# Patient Record
Sex: MALE | Race: BLACK OR AFRICAN AMERICAN | Employment: FULL TIME | URBAN - METROPOLITAN AREA
[De-identification: names, ages, dates, MRNs, and addresses within clinical notes are randomized per-mention and may not be internally consistent; named-entity substitution may affect disease eponyms.]

---

## 2017-03-16 ENCOUNTER — OFFICE VISIT (OUTPATIENT)
Dept: FAMILY MEDICINE CLINIC | Age: 37
End: 2017-03-16

## 2017-03-16 VITALS
BODY MASS INDEX: 25.67 KG/M2 | OXYGEN SATURATION: 95 % | RESPIRATION RATE: 20 BRPM | SYSTOLIC BLOOD PRESSURE: 132 MMHG | HEART RATE: 82 BPM | TEMPERATURE: 98 F | HEIGHT: 74 IN | DIASTOLIC BLOOD PRESSURE: 82 MMHG | WEIGHT: 200 LBS

## 2017-03-16 DIAGNOSIS — Z13.228 SCREENING FOR ENDOCRINE, METABOLIC AND IMMUNITY DISORDER: ICD-10-CM

## 2017-03-16 DIAGNOSIS — N48.89 PENILE PAIN: ICD-10-CM

## 2017-03-16 DIAGNOSIS — Z13.220 SCREENING FOR LIPID DISORDERS: ICD-10-CM

## 2017-03-16 DIAGNOSIS — Z13.0 SCREENING FOR ENDOCRINE, METABOLIC AND IMMUNITY DISORDER: ICD-10-CM

## 2017-03-16 DIAGNOSIS — Z72.0 TOBACCO USE: ICD-10-CM

## 2017-03-16 DIAGNOSIS — Z13.29 SCREENING FOR ENDOCRINE, METABOLIC AND IMMUNITY DISORDER: ICD-10-CM

## 2017-03-16 DIAGNOSIS — L30.9 ECZEMA, UNSPECIFIED TYPE: Primary | ICD-10-CM

## 2017-03-16 DIAGNOSIS — Z20.2 POSSIBLE EXPOSURE TO STD: ICD-10-CM

## 2017-03-16 RX ORDER — TRIAMCINOLONE ACETONIDE 5 MG/G
CREAM TOPICAL 2 TIMES DAILY
Qty: 15 G | Refills: 0 | Status: SHIPPED | OUTPATIENT
Start: 2017-03-16 | End: 2017-04-24 | Stop reason: SDUPTHER

## 2017-03-16 NOTE — PROGRESS NOTES
Patient: Celina Gómez MRN: [id number]  SSN: [id number]    YOB: 1980  Age: 43 y.o. Sex: male      Date of Service: 3/16/2017   Provider: Eorn Whitmore   Office Location:   [address] [facility], [facility], [address]  Office Phone: [phone]  Office Fax: [phone]        REASON FOR VISIT:   Chief Complaint   Patient presents with    Dry Skin     pt presents for Ecezma     Penis Pain      pt presents for penis pain and std testing         VITALS:   Visit Vitals    /82    Pulse 82    Temp 98 °F (36.7 °C) (Oral)    Resp 20    Ht 6' 2\" (1.88 m)    Wt 200 lb (90.7 kg)    SpO2 95%    BMI 25.68 kg/m2       MEDICATIONS:   No current medications     ALLERGIES:   No Known Allergies     ACTIVE MEDICAL PROBLEMS:  There is no problem list on file for this patient. MEDICAL/SURGICAL HISTORY:  History reviewed. No pertinent past medical history. History reviewed. No pertinent surgical history. FAMILY HISTORY:  History reviewed. No pertinent family history. SOCIAL HISTORY:  Social History   Substance Use Topics    Smoking status: Current Every Day Smoker    Smokeless tobacco: Not on file    Alcohol use Yes        HISTORY OF PRESENT ILLNESS:   Celina Gómez is a 43 y.o. male who presents to the office to establish care as a new patient    Chronic Medical Problems -   Patient reports no chronic medical problems. Has not seen a primary care provider in years. Acute Concerns -  Rash -   Patient complains of a pruritic rash on his lower abdomen, b/l elbows and knees x several months. Rash is described as \"itchy dry skin,\" which has become hard and thickened. He wonders if he may be developing eczema. Patient has tried  hydrocortisone cream, aveeno lotion, and another OTC eczema cream with minimal relief. Penile pain -   Patient complains of a \"painful vein\" on the dorsal surface of his penis x 3-4 days.  He states the vein has always been present, but appears swollen now. Describes an \"aching\" pain in the area of this vein, especially when he is walking or standing, or when he has an erection. He has tried Advil with minimal relief. He would like to be tested for STDs, as he reports he has been exposed to a female partner who tested positive for HSV 2 recently. REVIEW OF SYSTEMS:  Review of Systems   Constitutional: Negative for chills and fever. Respiratory: Negative for cough and shortness of breath. Cardiovascular: Negative for chest pain. Gastrointestinal: Negative for abdominal pain, nausea and vomiting. Genitourinary: Negative for dysuria, frequency, hematuria and urgency. Denies penile lesions or discharge   Skin: Positive for itching and rash. PHYSICAL EXAMINATION:  Physical Exam   Constitutional: He is well-developed, well-nourished, and in no distress. Cardiovascular: Normal rate, regular rhythm and normal heart sounds. Exam reveals no gallop and no friction rub. No murmur heard. Pulmonary/Chest: Effort normal and breath sounds normal. He has no wheezes. He has no rales. Genitourinary: Penis normal. No discharge found. Genitourinary Comments: prominent vein on dorsal surface of penis. No palpable clot. Nontender to palpation. No lesions noted   Skin: Skin is warm and dry. Rash ( patches of dry, thickened skin b/l elbows, lower abdomen. evidence of excoriation present) noted. RESULTS:  No results found for this visit on 03/16/17. ASSESSMENT/PLAN:  Evelio Rai was seen today for dry skin and penis pain.     Diagnoses and all orders for this visit:    Eczema, unspecified type  - Suspect atopic dermatitis   - Will trial higher potency steroid cream and refer to dermatology for further evaluation  - Regarding rash on lower abdomen, encouraged patient to change belts, as metal belt buckle may be exacerbating his skin condition   -     triamcinolone (ARISTOCORT) 0.5 % topical cream; Apply  to affected area two (2) times a day. use thin layer  -     REFERRAL TO DERMATOLOGY    Tobacco use  - Counseled on smoking cessation     Possible exposure to STD  -     HIV 1/2 AG/AB, 4TH GENERATION,W RFLX CONFIRM; Future  -     HSV 1/2 AB, IGG/IGM; Future  -     RPR; Future  -     Cancel: CHLAMYDIA/NEISSERIA/TRICHOMONAS AMP; Future  -     CHLAMYDIA/NEISSERIA/TRICHOMONAS AMP; Future    Penile pain  - Reassured of essentially normal exam, unsure of significance of prominent vein on penis  - Encouraged patient to wear supportive underwear and avoid sexual activity until STD screening results are back  - Will refer to urology for further evaluation. Advised he may cancel appointment if symptoms resolve. -     Cancel: CHLAMYDIA/NEISSERIA/TRICHOMONAS AMP; Future  -     REFERRAL TO UROLOGY  -     CHLAMYDIA/NEISSERIA/TRICHOMONAS AMP; Future    Screening for lipid disorders  -     LIPID PANEL; Future    Screening for endocrine, metabolic and immunity disorder  -     METABOLIC PANEL, COMPREHENSIVE    Check screening labs  Follow up with me in about a month for a routine physical and to review labs    Patient expresses understanding and is agreeable with the above plan.         PATIENT CARE TEAM:   Patient Care Team:  Eron Figueroa as PCP - [address] [place], [place]   March 20, 2017   8:35 AM

## 2017-03-16 NOTE — MR AVS SNAPSHOT
Visit Information Date & Time Provider Department Dept. Phone Encounter #  
 3/16/2017  5:30 PM Westley Dowd, [facility] [id number] Upcoming Health Maintenance Date Due DTaP/Tdap/Td series (1 - Tdap) 9/17/1999 INFLUENZA AGE 9 TO ADULT 8/1/2016 Allergies as of 3/16/2017  Review Complete On: 3/16/2017 By: LISBETH Galloway No Known Allergies Current Immunizations  Never Reviewed No immunizations on file. Not reviewed this visit You Were Diagnosed With   
  
 Codes Comments Eczema, unspecified type    -  Primary ICD-10-CM: L30.9 ICD-9-CM: 692.9 Tobacco use     ICD-10-CM: Z72.0 ICD-9-CM: 305.1 Possible exposure to STD     ICD-10-CM: Z20.2 ICD-9-CM: V01.6 Penile pain     ICD-10-CM: N48.89 ICD-9-CM: 607.9 Screening for lipid disorders     ICD-10-CM: Z13.220 ICD-9-CM: V77.91 Screening for endocrine, metabolic and immunity disorder     ICD-10-CM: Z13.29, Z13.228, Z13.0 ICD-9-CM: V77.99 Vitals BP Pulse Temp Resp Height(growth percentile) Weight(growth percentile) 132/82 82 98 °F (36.7 °C) (Oral) 20 6' 2\" (1.88 m) 200 lb (90.7 kg) SpO2 BMI Smoking Status 95% 25.68 kg/m2 Current Every Day Smoker Vitals History BMI and BSA Data Body Mass Index Body Surface Area  
 25.68 kg/m 2 2.18 m 2 Preferred Pharmacy Pharmacy Name Phone [facility] Beverley Sims [address] [place] [address] [phone] Your Updated Medication List  
  
   
This list is accurate as of: 3/16/17  6:21 PM.  Always use your most recent med list.  
  
  
  
  
 triamcinolone 0.5 % topical cream  
Commonly known as:  ARISTOCORT Apply  to affected area two (2) times a day. use thin layer Prescriptions Sent to Pharmacy  Refills  
 triamcinolone (ARISTOCORT) 0.5 % topical cream 0  
 Sig: Apply  to affected area two (2) times a day. use thin layer Class: Normal  
 Pharmacy: [facility] [address] [place] [address] Beverley Sims [address] [place] [address] #: [phone] Route: Topical  
  
We Performed the Following METABOLIC PANEL, COMPREHENSIVE [19985 CPT(R)] REFERRAL TO DERMATOLOGY [REF19 Custom] Comments:  
 Please evaluate patient for possible eczema. REFERRAL TO UROLOGY [AUL521 Custom] Comments:  
 Please evaluate patient for painful varicose vein on penis. To-Do List   
 03/16/2017 Lab:  CHLAMYDIA/NEISSERIA/TRICHOMONAS AMP   
  
 03/16/2017 Lab:  HIV 1/2 AG/AB, 4TH GENERATION,W RFLX CONFIRM   
  
 03/16/2017 Lab:  HSV 1/2 AB, IGG/IGM   
  
 03/16/2017 Lab:  RPR Around 03/17/2017 Lab:  LIPID PANEL Referral Information Referral ID Referred By Referred To  
  
 [id number] Shahid WINSTON Not Available Visits Status Start Date End Date 1 New Request 3/16/17 3/16/18 If your referral has a status of pending review or denied, additional information will be sent to support the outcome of this decision. Referral ID Referred By Referred To  
 [id number] Shahid WINSTON Not Available Visits Status Start Date End Date 1 New Request 3/16/17 3/16/18 If your referral has a status of pending review or denied, additional information will be sent to support the outcome of this decision. Introducing [facility]! Galina Candelaria introduces Heroes2u patient portal. Now you can access parts of your medical record, email your doctor's office, and request medication refills online. 1. In your internet browser, go to [email or web address] 2. Click on the First Time User? Click Here link in the Sign In box. You will see the New Member Sign Up page. 3. Enter your Heroes[employer]u Access Code exactly as it appears below.  You will not need to use this code after youve completed the sign-up process. If you do not sign up before the expiration date, you must request a new code. · Simple IT Access Code: [email or web address] Expires: 6/14/2017  5:20 PM 
 
4. Enter the last four digits of your Social Security Number (xxxx) and Date of Birth (mm/dd/yyyy) as indicated and click Submit. You will be taken to the next sign-up page. 5. Create a Simple IT ID. This will be your Simple IT login ID and cannot be changed, so think of one that is secure and easy to remember. 6. Create a Simple IT password. You can change your password at any time. 7. Enter your Password Reset Question and Answer. This can be used at a later time if you forget your password. 8. Enter your e-mail address. You will receive e-mail notification when new information is available in [address] 9. Click Sign Up. You can now view and download portions of your medical record. 10. Click the Download Summary menu link to download a portable copy of your medical information. If you have questions, please visit the Frequently Asked Questions section of the Simple IT website. Remember, Simple IT is NOT to be used for urgent needs. For medical emergencies, dial 911. Now available from your iPhone and Android! Please provide this summary of care documentation to your next provider. Your primary care clinician is listed as Sylvie Rice. If you have any questions after today's visit, please call [phone].

## 2017-03-17 DIAGNOSIS — Z13.220 SCREENING FOR LIPID DISORDERS: ICD-10-CM

## 2017-03-18 LAB
CHLAMYDIA AMPLIFIED URINE: NEGATIVE
GC AMPLIFIED URINE,919: NEGATIVE
TRICH NUCU, 17621: NEGATIVE

## 2017-03-21 ENCOUNTER — HOSPITAL ENCOUNTER (OUTPATIENT)
Dept: LAB | Age: 37
Discharge: HOME | End: 2017-03-21

## 2017-03-21 ENCOUNTER — TELEPHONE (OUTPATIENT)
Dept: FAMILY MEDICINE CLINIC | Age: 37
End: 2017-03-21

## 2017-03-21 LAB — SENTARA SPECIMEN COL,SENBCF: NORMAL

## 2017-03-21 PROCEDURE — 99001 SPECIMEN HANDLING PT-LAB: CPT | Performed by: PHYSICIAN ASSISTANT

## 2017-03-21 NOTE — TELEPHONE ENCOUNTER
Patient called stating he was returning JAMI PERKINS [facility] call.  He can be reached at [phone]

## 2017-03-22 LAB
CHOLEST SERPL-MCNC: 154 MG/DL (ref 110–200)
HDLC SERPL-MCNC: 35 MG/DL (ref 40–59)
HIV -1/0/2 AG/AB WITH REFLEX, 13463: NON REACTIVE
HIV 1 & 2 AB SER-IMP: NORMAL
HSV 2 AB,IGG, HS2G: >8 AI
HSV1 IGG SER QL: 0.2 AI
LDLC SERPL CALC-MCNC: 103 MG/DL (ref 50–99)
SYPHILIS (T. PALLIDUM) IGG, 15809: NON REACTIVE
TRIGL SERPL-MCNC: 80 MG/DL (ref 40–149)
VLDLC SERPL CALC-MCNC: 16 MG/DL (ref 8–30)

## 2017-03-23 LAB — HSV IGM I/II COMBINATION AB: 1.01 RATIO

## 2017-04-24 DIAGNOSIS — L30.9 ECZEMA, UNSPECIFIED TYPE: ICD-10-CM

## 2017-04-25 RX ORDER — TRIAMCINOLONE ACETONIDE 5 MG/G
CREAM TOPICAL
Qty: 15 G | Refills: 0 | Status: SHIPPED | OUTPATIENT
Start: 2017-04-25 | End: 2022-03-31 | Stop reason: ALTCHOICE

## 2019-01-14 ENCOUNTER — HOSPITAL ENCOUNTER (OUTPATIENT)
Dept: LAB | Age: 39
Discharge: HOME | End: 2019-01-14

## 2019-01-14 LAB — SENTARA SPECIMEN COL,SENBCF: NORMAL

## 2019-01-14 PROCEDURE — 99001 SPECIMEN HANDLING PT-LAB: CPT

## 2019-01-15 ENCOUNTER — HOSPITAL ENCOUNTER (OUTPATIENT)
Dept: LAB | Age: 39
Discharge: HOME | End: 2019-01-15

## 2019-01-15 LAB — SENTARA SPECIMEN COL,SENBCF: NORMAL

## 2019-01-15 PROCEDURE — 99001 SPECIMEN HANDLING PT-LAB: CPT

## 2022-03-18 PROBLEM — L30.9 ECZEMA: Status: ACTIVE | Noted: 2017-03-16

## 2022-03-19 PROBLEM — Z72.0 TOBACCO USE: Status: ACTIVE | Noted: 2017-03-16

## 2022-03-30 ENCOUNTER — HOSPITAL ENCOUNTER (OUTPATIENT)
Age: 42
Setting detail: OBSERVATION
Discharge: LEFT BEFORE DISCHARGE | End: 2022-03-31
Attending: EMERGENCY MEDICINE | Admitting: FAMILY MEDICINE
Payer: MEDICAID

## 2022-03-30 ENCOUNTER — APPOINTMENT (OUTPATIENT)
Dept: GENERAL RADIOLOGY | Age: 42
End: 2022-03-30
Attending: EMERGENCY MEDICINE
Payer: MEDICAID

## 2022-03-30 DIAGNOSIS — I26.94 MULTIPLE SUBSEGMENTAL PULMONARY EMBOLI WITHOUT ACUTE COR PULMONALE (HCC): Primary | ICD-10-CM

## 2022-03-30 DIAGNOSIS — F22 DELUSIONS (HCC): ICD-10-CM

## 2022-03-30 LAB
ALBUMIN SERPL-MCNC: 3.6 G/DL (ref 3.4–5)
ALBUMIN/GLOB SERPL: 0.9 {RATIO} (ref 0.8–1.7)
ALP SERPL-CCNC: 65 U/L (ref 45–117)
ALT SERPL-CCNC: 38 U/L (ref 16–61)
ANION GAP SERPL CALC-SCNC: 6 MMOL/L (ref 3–18)
AST SERPL-CCNC: 19 U/L (ref 10–38)
BILIRUB SERPL-MCNC: 1.1 MG/DL (ref 0.2–1)
BUN SERPL-MCNC: 12 MG/DL (ref 7–18)
BUN/CREAT SERPL: 10 (ref 12–20)
CALCIUM SERPL-MCNC: 9.3 MG/DL (ref 8.5–10.1)
CHLORIDE SERPL-SCNC: 103 MMOL/L (ref 100–111)
CO2 SERPL-SCNC: 27 MMOL/L (ref 21–32)
CREAT SERPL-MCNC: 1.19 MG/DL (ref 0.6–1.3)
GLOBULIN SER CALC-MCNC: 3.9 G/DL (ref 2–4)
GLUCOSE SERPL-MCNC: 116 MG/DL (ref 74–99)
LIPASE SERPL-CCNC: 53 U/L (ref 73–393)
POTASSIUM SERPL-SCNC: 3.9 MMOL/L (ref 3.5–5.5)
PROT SERPL-MCNC: 7.5 G/DL (ref 6.4–8.2)
SODIUM SERPL-SCNC: 136 MMOL/L (ref 136–145)
TROPONIN-HIGH SENSITIVITY: 4 NG/L (ref 0–78)

## 2022-03-30 PROCEDURE — 71045 X-RAY EXAM CHEST 1 VIEW: CPT

## 2022-03-30 PROCEDURE — 85379 FIBRIN DEGRADATION QUANT: CPT

## 2022-03-30 PROCEDURE — 85025 COMPLETE CBC W/AUTO DIFF WBC: CPT

## 2022-03-30 PROCEDURE — 99285 EMERGENCY DEPT VISIT HI MDM: CPT

## 2022-03-30 PROCEDURE — 84484 ASSAY OF TROPONIN QUANT: CPT

## 2022-03-30 PROCEDURE — 96374 THER/PROPH/DIAG INJ IV PUSH: CPT

## 2022-03-30 PROCEDURE — 82077 ASSAY SPEC XCP UR&BREATH IA: CPT

## 2022-03-30 PROCEDURE — 93005 ELECTROCARDIOGRAM TRACING: CPT

## 2022-03-30 PROCEDURE — 80053 COMPREHEN METABOLIC PANEL: CPT

## 2022-03-30 PROCEDURE — 74011250636 HC RX REV CODE- 250/636: Performed by: EMERGENCY MEDICINE

## 2022-03-30 PROCEDURE — 83690 ASSAY OF LIPASE: CPT

## 2022-03-30 RX ORDER — KETOROLAC TROMETHAMINE 15 MG/ML
15 INJECTION, SOLUTION INTRAMUSCULAR; INTRAVENOUS ONCE
Status: COMPLETED | OUTPATIENT
Start: 2022-03-30 | End: 2022-03-30

## 2022-03-30 RX ADMIN — KETOROLAC TROMETHAMINE 15 MG: 15 INJECTION, SOLUTION INTRAMUSCULAR; INTRAVENOUS at 23:32

## 2022-03-30 NOTE — Clinical Note
Status[de-identified] INPATIENT [101]  Type of Bed: Medical [8]  Inpatient Hospitalization Certified Necessary for the Following Reasons: 3.  Patient receiving treatment that can only be provided in an inpatient setting (further clarification in H&P documentation)   Admitting Diagnosis: Pulmonary embolism and infarction [facility]) [[id number]]  Admitting Physician: Michelle Ozuna [[id number]]  Attending Physician: Michelle Ozuna [[id number]]  Estimated Length of Stay: 2 Midnights  Discharge Plan[de-identified] Home with Office Follow-u p

## 2022-03-31 ENCOUNTER — APPOINTMENT (OUTPATIENT)
Dept: CT IMAGING | Age: 42
End: 2022-03-31
Attending: EMERGENCY MEDICINE
Payer: MEDICAID

## 2022-03-31 ENCOUNTER — APPOINTMENT (OUTPATIENT)
Dept: ULTRASOUND IMAGING | Age: 42
End: 2022-03-31
Attending: EMERGENCY MEDICINE
Payer: MEDICAID

## 2022-03-31 VITALS
WEIGHT: 210 LBS | TEMPERATURE: 99.1 F | SYSTOLIC BLOOD PRESSURE: 101 MMHG | OXYGEN SATURATION: 98 % | HEIGHT: 74 IN | DIASTOLIC BLOOD PRESSURE: 63 MMHG | RESPIRATION RATE: 17 BRPM | BODY MASS INDEX: 26.95 KG/M2 | HEART RATE: 69 BPM

## 2022-03-31 PROBLEM — I26.99 PULMONARY EMBOLISM AND INFARCTION (HCC): Status: ACTIVE | Noted: 2022-03-31

## 2022-03-31 PROBLEM — I26.99 PULMONARY EMBOLISM (HCC): Status: ACTIVE | Noted: 2022-03-31

## 2022-03-31 LAB
APTT PPP: 28.6 SEC (ref 23–36.4)
ATRIAL RATE: 86 BPM
B PERT DNA SPEC QL NAA+PROBE: NOT DETECTED
BASOPHILS # BLD: 0 K/UL (ref 0–0.1)
BASOPHILS # BLD: 0 K/UL (ref 0–0.1)
BASOPHILS NFR BLD: 0 % (ref 0–2)
BASOPHILS NFR BLD: 0 % (ref 0–2)
BORDETELLA PARAPERTUSSIS PCR, BORPAR: NOT DETECTED
C PNEUM DNA SPEC QL NAA+PROBE: NOT DETECTED
CALCULATED P AXIS, ECG09: 56 DEGREES
CALCULATED R AXIS, ECG10: 64 DEGREES
CALCULATED T AXIS, ECG11: 50 DEGREES
D DIMER PPP FEU-MCNC: 1.87 UG/ML(FEU)
DIAGNOSIS, 93000: NORMAL
DIFFERENTIAL METHOD BLD: ABNORMAL
DIFFERENTIAL METHOD BLD: ABNORMAL
EOSINOPHIL # BLD: 0 K/UL (ref 0–0.4)
EOSINOPHIL # BLD: 0 K/UL (ref 0–0.4)
EOSINOPHIL NFR BLD: 0 % (ref 0–5)
EOSINOPHIL NFR BLD: 0 % (ref 0–5)
ERYTHROCYTE [DISTWIDTH] IN BLOOD BY AUTOMATED COUNT: 12.2 % (ref 11.6–14.5)
ERYTHROCYTE [DISTWIDTH] IN BLOOD BY AUTOMATED COUNT: 12.2 % (ref 11.6–14.5)
ETHANOL SERPL-MCNC: <3 MG/DL (ref 0–3)
FLUAV SUBTYP SPEC NAA+PROBE: NOT DETECTED
FLUBV RNA SPEC QL NAA+PROBE: NOT DETECTED
HADV DNA SPEC QL NAA+PROBE: NOT DETECTED
HCOV 229E RNA SPEC QL NAA+PROBE: NOT DETECTED
HCOV HKU1 RNA SPEC QL NAA+PROBE: NOT DETECTED
HCOV NL63 RNA SPEC QL NAA+PROBE: NOT DETECTED
HCOV OC43 RNA SPEC QL NAA+PROBE: NOT DETECTED
HCT VFR BLD AUTO: 40.2 % (ref 36–48)
HCT VFR BLD AUTO: 43.6 % (ref 36–48)
HGB BLD-MCNC: 13.8 G/DL (ref 13–16)
HGB BLD-MCNC: 15 G/DL (ref 13–16)
HMPV RNA SPEC QL NAA+PROBE: NOT DETECTED
HPIV1 RNA SPEC QL NAA+PROBE: NOT DETECTED
HPIV2 RNA SPEC QL NAA+PROBE: NOT DETECTED
HPIV3 RNA SPEC QL NAA+PROBE: NOT DETECTED
HPIV4 RNA SPEC QL NAA+PROBE: NOT DETECTED
IMM GRANULOCYTES # BLD AUTO: 0 K/UL (ref 0–0.04)
IMM GRANULOCYTES # BLD AUTO: 0.1 K/UL (ref 0–0.04)
IMM GRANULOCYTES NFR BLD AUTO: 0 % (ref 0–0.5)
IMM GRANULOCYTES NFR BLD AUTO: 0 % (ref 0–0.5)
LYMPHOCYTES # BLD: 1.7 K/UL (ref 0.9–3.6)
LYMPHOCYTES # BLD: 1.7 K/UL (ref 0.9–3.6)
LYMPHOCYTES NFR BLD: 13 % (ref 21–52)
LYMPHOCYTES NFR BLD: 13 % (ref 21–52)
M PNEUMO DNA SPEC QL NAA+PROBE: NOT DETECTED
MCH RBC QN AUTO: 31.9 PG (ref 24–34)
MCH RBC QN AUTO: 31.9 PG (ref 24–34)
MCHC RBC AUTO-ENTMCNC: 34.3 G/DL (ref 31–37)
MCHC RBC AUTO-ENTMCNC: 34.4 G/DL (ref 31–37)
MCV RBC AUTO: 92.8 FL (ref 78–100)
MCV RBC AUTO: 93.1 FL (ref 78–100)
MONOCYTES # BLD: 1.3 K/UL (ref 0.05–1.2)
MONOCYTES # BLD: 1.7 K/UL (ref 0.05–1.2)
MONOCYTES NFR BLD: 10 % (ref 3–10)
MONOCYTES NFR BLD: 13 % (ref 3–10)
NEUTS SEG # BLD: 9.2 K/UL (ref 1.8–8)
NEUTS SEG # BLD: 9.9 K/UL (ref 1.8–8)
NEUTS SEG NFR BLD: 73 % (ref 40–73)
NEUTS SEG NFR BLD: 77 % (ref 40–73)
NRBC # BLD: 0 K/UL (ref 0–0.01)
NRBC # BLD: 0 K/UL (ref 0–0.01)
NRBC BLD-RTO: 0 PER 100 WBC
NRBC BLD-RTO: 0 PER 100 WBC
P-R INTERVAL, ECG05: 164 MS
PLATELET # BLD AUTO: 161 K/UL (ref 135–420)
PLATELET # BLD AUTO: 167 K/UL (ref 135–420)
PLATELET COMMENTS,PCOM: ABNORMAL
PMV BLD AUTO: 9.1 FL (ref 9.2–11.8)
PMV BLD AUTO: 9.1 FL (ref 9.2–11.8)
Q-T INTERVAL, ECG07: 336 MS
QRS DURATION, ECG06: 86 MS
QTC CALCULATION (BEZET), ECG08: 402 MS
RBC # BLD AUTO: 4.32 M/UL (ref 4.35–5.65)
RBC # BLD AUTO: 4.7 M/UL (ref 4.35–5.65)
RBC MORPH BLD: ABNORMAL
RSV RNA SPEC QL NAA+PROBE: NOT DETECTED
RV+EV RNA SPEC QL NAA+PROBE: NOT DETECTED
SARS-COV-2 PCR, COVPCR: NOT DETECTED
VENTRICULAR RATE, ECG03: 86 BPM
WBC # BLD AUTO: 12.7 K/UL (ref 4.6–13.2)
WBC # BLD AUTO: 12.9 K/UL (ref 4.6–13.2)

## 2022-03-31 PROCEDURE — 96366 THER/PROPH/DIAG IV INF ADDON: CPT

## 2022-03-31 PROCEDURE — 76705 ECHO EXAM OF ABDOMEN: CPT

## 2022-03-31 PROCEDURE — 65270000029 HC RM PRIVATE

## 2022-03-31 PROCEDURE — 85730 THROMBOPLASTIN TIME PARTIAL: CPT

## 2022-03-31 PROCEDURE — G0378 HOSPITAL OBSERVATION PER HR: HCPCS

## 2022-03-31 PROCEDURE — 74177 CT ABD & PELVIS W/CONTRAST: CPT

## 2022-03-31 PROCEDURE — 74011250636 HC RX REV CODE- 250/636: Performed by: EMERGENCY MEDICINE

## 2022-03-31 PROCEDURE — 74011000636 HC RX REV CODE- 636: Performed by: EMERGENCY MEDICINE

## 2022-03-31 PROCEDURE — 85025 COMPLETE CBC W/AUTO DIFF WBC: CPT

## 2022-03-31 PROCEDURE — 96367 TX/PROPH/DG ADDL SEQ IV INF: CPT

## 2022-03-31 PROCEDURE — 0202U NFCT DS 22 TRGT SARS-COV-2: CPT

## 2022-03-31 PROCEDURE — 71275 CT ANGIOGRAPHY CHEST: CPT

## 2022-03-31 RX ORDER — HEPARIN SODIUM 1000 [USP'U]/ML
80 INJECTION, SOLUTION INTRAVENOUS; SUBCUTANEOUS ONCE
Status: COMPLETED | OUTPATIENT
Start: 2022-03-31 | End: 2022-03-31

## 2022-03-31 RX ORDER — POLYETHYLENE GLYCOL 3350 17 G/17G
17 POWDER, FOR SOLUTION ORAL DAILY PRN
Status: DISCONTINUED | OUTPATIENT
Start: 2022-03-31 | End: 2022-03-31 | Stop reason: HOSPADM

## 2022-03-31 RX ORDER — SODIUM CHLORIDE 0.9 % (FLUSH) 0.9 %
5-40 SYRINGE (ML) INJECTION EVERY 8 HOURS
Status: DISCONTINUED | OUTPATIENT
Start: 2022-03-31 | End: 2022-03-31 | Stop reason: HOSPADM

## 2022-03-31 RX ORDER — SODIUM CHLORIDE 0.9 % (FLUSH) 0.9 %
5-40 SYRINGE (ML) INJECTION AS NEEDED
Status: DISCONTINUED | OUTPATIENT
Start: 2022-03-31 | End: 2022-03-31 | Stop reason: HOSPADM

## 2022-03-31 RX ADMIN — IOPAMIDOL 100 ML: 755 INJECTION, SOLUTION INTRAVENOUS at 01:28

## 2022-03-31 RX ADMIN — HEPARIN SODIUM 7620 UNITS: 1000 INJECTION, SOLUTION INTRAVENOUS; SUBCUTANEOUS at 02:34

## 2022-03-31 RX ADMIN — HEPARIN SODIUM 18 UNITS/KG/HR: 1000 INJECTION, SOLUTION INTRAVENOUS; SUBCUTANEOUS at 02:34

## 2022-03-31 NOTE — ED TRIAGE NOTES
Pt arrives via EMS with c/o RUQ pain that radiates to the shoulder; onset of pain was this morning. Stated he has not had a bowel movement today & took some gas relief medication, believing that was the cause of the pain. Stated pain is \"excruciating\" & nothing makes it worse or better. No hx of abdominal surgery.

## 2022-03-31 NOTE — DISCHARGE SUMMARY
[address] Medicine  Discharge Summary AMA    Patient: Robert Woodall MRN: [id number]  CSN: [id number]    YOB: 1980  Age: 35 y.o. Sex: male      Admission Date: 3/30/2022 Discharge Date: 3/31/2022   Attending: Dr. Para Lanes PCP: Isael Warren MD     ===================================================================    Patient left AMA after admission orders were placed but before HPI submitted. Nurse documented encounter where pt demonstrated intent to harm others upon leaving the hospital. ED attending Dr. Roula Medrano documented crisis consult at that time. This writer did have conversation with pt where he was A&Ox4 and verbalized understanding that risk of leaving without proper treatment could be death and benefits would mitigate that though continued to state nonspecific claims that he would \"harm the people responsible for doing this to him\". Shortly after this encounter pt left the floor abruptly. ED charge nurse notified local authorities who responded and were alleged to have found the pt though pt was not returned to the hospital.      Reason for Admission: Pulmonary embolism ([facility] [address] [I26.99]  Pulmonary embolism and infarction [facility]) [I26.99]    Discharge Diagnoses:   Pulmonary Embolism  Homicidal Ideation    Important notes to PCP/ follow-up studies and evaluations   Confirm patient is still a [facility] Medicine Patient    Pending labs and studies:  none    Operative Procedures:   none    Discharge Medications:     Discharge Medication List as of 3/31/2022  5:09 AM   NONE     Disposition: Unknown - home/car?     Consultants:  Crisis management (patient was not seen due to [facility])    [address] [facility] Hospital Course (including pertinent history and physical findings)  Reason for admission: Pulmonary Embolism    Bilateral Pulmonary Embolism with Likely RLL Pulmonary Infarct vs superimposed infection              Arrived with SOB, RUQ pain with radiation to right shoulder, EKG NSR, troponins 4 WNL, less likely cardiac. Lipase 53 WNL unlikely pancreatitis, also no N/V or diarrhea, RUQ US normal and with neg murphys which makes cholecystitis unlikely, CT abd pelvis non-acute, Dimer elevated to 1.87, CTA chest: B/L acute PE's with moderate clot burden in RLL along with patchy consolidation concerning for infarcts vs superimposed infectious infiltrate. CXR pending. Presentation and all features currently most likely 2/2 to acute B/L PE's with RLL infarct. Though he does report a purely subjective fever doubt infectious component as pt denies sick contacts, coughing, runny nose. Unclear whether this was provoked or unprovoked as physical exam did not reveal popliteal TTP or extremity swelling and pt denies hx or fmhx of hypercoagulability but suspect DVT as source given pt extended hx of prolonged immobility within his car. Plan:  - admit to medicine w/ tele  - continue heparin gtt for now, can transition to PO NOAC later today  - f/u BLE PVLs  - Consider pulm consult for RLL infarc    Homicidal Ideation  Patient endorsed homicidal ideations and left AMA. [place] [employer] informed of patient's passive non-specific homicidal ideations. Primary team followed up with [facility] non-emergency line and ED staff who stated that Police has found patient but he was not brought back to hospital.      CURRENT ADMISSION IMAGING RESULTS   CTA CHEST W OR W WO CONT    Result Date: 3/31/2022  Bilateral right greater than left lower lobe acute appearing pulmonary emboli, moderate burden in the right lower lobe. No specific evidence of right heart strain. Right lower lobe patchy consolidation, likely at least partly due to infarcts in the setting, but superimposed infectious infiltrate not excluded. -Left extensive hazy streaky densities at the left lower lobe. Trace right pleural effusion. Significant findings called to Dr. Alexx Berry at 0145 hours, 3/31/2022.  Heterogeneous thyroid with the better characterized by ultrasound. CT ABD PELV W CONT    Result Date: 3/31/2022  No acute findings in abdomen or pelvis. See same day CTA chest report. US ABD LTD    Result Date: 3/31/2022  No clearly acute findings. Slightly increased right renal echotexture could reflect medical renal disease. Borderline prominent liver size.         Cardiology Procedures/Testing:  MODALITY RESULTS   EKG Results for orders placed or performed during the hospital encounter of 03/30/22   EKG, 12 LEAD, INITIAL   Result Value Ref Range    Ventricular Rate 86 BPM    Atrial Rate 86 BPM    P-R Interval 164 ms    QRS Duration 86 ms    Q-T Interval 336 ms    QTC Calculation (Bezet) 402 ms    Calculated P Axis 56 degrees    Calculated R Axis 64 degrees    Calculated T Axis 50 degrees    Diagnosis       Normal sinus rhythm  Normal ECG  No previous ECGs available           Special Testing/Procedures:  MODALITY RESULTS   MICRO All Micro Results     Procedure Component Value Units Date/Time    RESPIRATORY VIRUS PANEL W/COVID-19, PCR [[id number]] Collected: 03/31/22 0407    Order Status: Completed Specimen: Nasopharyngeal Updated: 03/31/22 0506     Adenovirus Not detected        Coronavirus 229E Not detected        Coronavirus HKU1 Not detected        Coronavirus CVNL63 Not detected        Coronavirus OC43 Not detected        SARS-CoV-2, PCR Not detected        Metapneumovirus Not detected        Rhinovirus and Enterovirus Not detected        Influenza A Not detected        Influenza B Not detected        Parainfluenza 1 Not detected        Parainfluenza 2 Not detected        Parainfluenza 3 Not detected        Parainfluenza virus 4 Not detected        RSV by PCR Not detected        B. parapertussis, PCR Not detected        Bordetella pertussis - PCR Not detected        Chlamydophila pneumoniae DNA, QL, PCR Not detected        Mycoplasma pneumoniae DNA, QL, PCR Not detected              Laboratory Results:  LABORATORY RESULTS   HEMATOLOGY Lab Results   Component Value Date/Time    WBC 12.7 03/31/2022 02:28 AM    HGB 13.8 03/31/2022 02:28 AM    HCT 40.2 03/31/2022 02:28 AM    PLATELET 416 31/26/0832 02:28 AM    MCV 93.1 03/31/2022 02:28 AM       CHEMISTRIES Lab Results   Component Value Date/Time    Sodium 136 03/30/2022 11:25 PM    Potassium 3.9 03/30/2022 11:25 PM    Chloride 103 03/30/2022 11:25 PM    CO2 27 03/30/2022 11:25 PM    Anion gap 6 03/30/2022 11:25 PM    Glucose 116 (H) 03/30/2022 11:25 PM    BUN 12 03/30/2022 11:25 PM    Creatinine 1.19 03/30/2022 11:25 PM    BUN/Creatinine ratio 10 (L) 03/30/2022 11:25 PM    GFR est AA >60 03/30/2022 11:25 PM    GFR est non-AA >60 03/30/2022 11:25 PM    Calcium 9.3 03/30/2022 11:25 PM      HEPATIC FUNCTION Lab Results   Component Value Date/Time    Albumin 3.6 03/30/2022 11:25 PM    Bilirubin, total 1.1 (H) 03/30/2022 11:25 PM    Protein, total 7.5 03/30/2022 11:25 PM    Globulin 3.9 03/30/2022 11:25 PM    A-G Ratio 0.9 03/30/2022 11:25 PM    ALT (SGPT) 38 03/30/2022 11:25 PM    Alk.  phosphatase 65 03/30/2022 11:25 PM       LACTIC ACID No results found for: LAC   CARDIAC PANEL No results found for: CPK, RCK1, RCK2, RCK3, RCK4, CKMB, CKNDX, CKND1, TROPT, TROIQ, BNPP, BNP   NT-proBNP No results found for: BNP, BNPP, BNPPPOC, XBNPT, BNPNT   THYROID No results found for: TSH, TSHEXT, TSH2, TSH3, TSHP, TSHELE, TT3, T3U, T3UP, FRT3, FT3, T3T, FT4, FT4P, T4, T4P, FT4T, TT7, TSHEXT   LIPID PANEL Lab Results   Component Value Date/Time    Cholesterol, total 154 03/21/2017 11:31 AM    HDL Cholesterol 35 (L) 03/21/2017 11:31 AM    LDL, calculated 103 (H) 03/21/2017 11:31 AM    VLDL, calculated 16 03/21/2017 11:31 AM    Triglyceride 80 03/21/2017 11:31 AM         RISK CALCULATORS:  SCORE RESULT   ASCVD The ASCVD Risk score (Stephanie Grimaldo, et al., 2013) failed to calculate for the following reasons:    Cannot find a previous HDL lab    Cannot find a previous total cholesterol lab    [id number]     HAS-BLED READMISSION RISK SCORE Low Risk            0 Total Score        Criteria that do not apply:    Has Seen PCP in Last 6 Months (Yes=3, No=0)    . Living with Significant Other. Assisted Living. LTAC. SNF. or   Rehab    Patient Length of Stay (>5 days = 3)    IP Visits Last 12 Months (1-3=4, 4=9, >4=11)    Pt.  Coverage (Medicare=5 , Medicaid, or Self-Pay=4)    Charlson Comorbidity Score (Age + Comorbid Conditions)           Functional status and cognitive function:    Ambulates with: independently   Status: alert, cooperative, no distress, appears stated age  Condition: STABLE  Disposition: home/car, patient left AMA    Diet: General Diet    Code status and advanced care plan: Full    Point of Contact Wiliam Gomez  Relationship: Mother  [phone]     Patient Education:  None - Patient left AMA    Follow-up:   Follow-up Information     Follow up With Specialties Details Why Contact Info    Pam Cooper MD Family Medicine In 3 days  [address]  [phone]            =================================================================    Naun Malagon MD  Resident Physician   [facility] Medicine   March 31, 2022

## 2022-03-31 NOTE — H&P
Admission History and Physical  [facility]          Patient: Radha Betancourt MRN: [id number]  CSN: [id number]    YOB: 1980  Age: 35 y.o. Sex: male      Admission Date: 3/30/2022       HPI:     Patient left AMA after admission orders were placed but before HPI submitted. Nurse documented encounter where pt demonstrated intent to harm others upon leaving the hospital. ED attending Dr. Amaris Rodríguez documented crisis consult at that time. This writer did have conversation with pt where he was A&Ox4 and verbalized understanding that risk of leaving without proper treatment could be death and benefits would mitigate that though continued to state nonspecific claims that he would \"harm the people responsible for doing this to him\". Shortly after this encounter pt left the floor abruptly. ED charge nurse notified local authorities who responded and were alleged to have found the pt though pt was not returned to the hospital.       Radha Betancourt is a 35 y.o. male with no reported pmhx, now presenting with complaint of SOB, hemoptysis, and RUQ pain. Pt reports that yesterday he suddenly became more SOB and had four episodes of coughing with blood tinged mucus. He awoke this morning with worsening SOB and new RUQ pain which radiated to the right shoulder, changes with movement, and hurts most on deep inspiration. Pt states this has never happened before. He has been living in his car for the last 3 years and keeps his car parked in front of his mothers house. He is largely immobile for many hours every day and has been for 2 years though he does get up and move around occasionally. He states he had a fever yesterday but didn't take his temperature. He hasnt felt feverish since and has been around no sick contacts. He denies HA, vision changes, abd pain, urinary/stool changes. Pt is on no medications.  States he last saw Dr. Angelica Melchor a couple of years ago and [facility]. He states he last went 2 weeks ago but denies having any medical conditions. Pt has smoked 1.5 packs of cigarettes per day for 20 yrs giving him a 30 pack year hx. Doesn't drink ETOH. Smokes marijuana but does no other drugs. Specifically denies hx of IVDU. No recent intercourse or partners. He states he is not depressed. Denies SI/HI     ED Course (See objective for values/interpretations): Arrived with SOB, RUQ pain with radiation to right shoulder, EKG NSR, troponins 4 WNL, Lipase 53 WNL, RUQ US normal and with neg murphys, CT abd pelvis non-acute, Dimer elevated to 1.87, CTA chest: B/L acute PE's with moderate clot burden in RLL along with patchy consolidation concerning for infarcts vs superimposed infectious infiltrate. CXR pending. Labs obtained: Lipase, CBC, CMP, trops, ddimer  Medications administered: heparin gtt  Imaging obtained: CT abd pelvis, RUQ, US, CTA chest, CXR    ROS    History reviewed. No pertinent past medical history. History reviewed. No pertinent surgical history. History reviewed. No pertinent family history. Social History     Socioeconomic History    Marital status: SINGLE   Tobacco Use    Smoking status: Current Every Day Smoker   Substance and Sexual Activity    Alcohol use: Yes    Drug use: No    Sexual activity: Yes     Partners: Female     Birth control/protection: None       No Known Allergies    None       Objective:     Patient Vitals for the past 24 hrs:   Temp Pulse Resp BP SpO2   03/31/22 0245 -- 71 23 (!) 104/55 96 %   03/30/22 2355 -- -- -- 111/65 93 %   03/30/22 2330 -- -- -- 124/70 94 %   03/30/22 2329 99.6 °F (37.6 °C) 84 18 -- --   03/30/22 2329 -- -- -- -- 94 %   03/30/22 2314 -- -- -- 127/80 --       Physical Exam:   General:  AAOx3, NAD   HEENT: Conjunctiva pink, sclera anicteric. PERRL. EOMI. Pharynx moist, nonerythematous. Moist mucous membranes. Thyroid not enlarged, no nodules. No lymphadenopathy.   No other gross abnormalities present. CV:  RRR, no M/G/R. No visible pulsations or thrills. RESP:  Unlabored breathing. Lungs CTAB, no wheezes, rales or rhonchi appreciated. Equal expansion bilaterally. ABD:  Soft, nontender, nondistended. BS (+). No hepatosplenomegaly. No suprapubic tenderness. Negative murphys  MS:  No joint deformity or instability. No atrophy. Neuro:  CN II-XII grossly intact. 5/5 strength bilateral upper extremities and lower extremities. Ext:  No edema. 2+ radial and dp pulses bilaterally. No TTP of the popliteal spaces  Skin:  No rashes, lesions, or ulcers. Good turgor. Psych: blunted mood with flattened affect. No SI/HI    Labs & Imaging:   Recent Results (from the past 48 hour(s))   CBC WITH AUTOMATED DIFF    Collection Time: 03/30/22 11:25 PM   Result Value Ref Range    WBC 12.9 4.6 - 13.2 K/uL    RBC 4.70 4.35 - 5.65 M/uL    HGB 15.0 13.0 - 16.0 g/dL    HCT 43.6 36.0 - 48.0 %    MCV 92.8 78.0 - 100.0 FL    MCH 31.9 24.0 - 34.0 PG    MCHC 34.4 31.0 - 37.0 g/dL    RDW 12.2 11.6 - 14.5 %    PLATELET 310 206 - 130 K/uL    MPV 9.1 (L) 9.2 - 11.8 FL    NRBC 0.0 0  WBC    ABSOLUTE NRBC 0.00 0.00 - 0.01 K/uL    NEUTROPHILS 77 (H) 40 - 73 %    LYMPHOCYTES 13 (L) 21 - 52 %    MONOCYTES 10 3 - 10 %    EOSINOPHILS 0 0 - 5 %    BASOPHILS 0 0 - 2 %    IMMATURE GRANULOCYTES 0 0.0 - 0.5 %    ABS. NEUTROPHILS 9.9 (H) 1.8 - 8.0 K/UL    ABS. LYMPHOCYTES 1.7 0.9 - 3.6 K/UL    ABS. MONOCYTES 1.3 (H) 0.05 - 1.2 K/UL    ABS. EOSINOPHILS 0.0 0.0 - 0.4 K/UL    ABS. BASOPHILS 0.0 0.0 - 0.1 K/UL    ABS. IMM.  GRANS. 0.0 0.00 - 0.04 K/UL    DF MANUAL      PLATELET COMMENTS ADEQUATE PLATELETS      RBC COMMENTS NORMOCYTIC, NORMOCHROMIC     METABOLIC PANEL, COMPREHENSIVE    Collection Time: 03/30/22 11:25 PM   Result Value Ref Range    Sodium 136 136 - 145 mmol/L    Potassium 3.9 3.5 - 5.5 mmol/L    Chloride 103 100 - 111 mmol/L    CO2 27 21 - 32 mmol/L    Anion gap 6 3.0 - 18 mmol/L    Glucose 116 (H) 74 - 99 mg/dL    BUN 12 7.0 - 18 MG/DL    Creatinine 1.19 0.6 - 1.3 MG/DL    BUN/Creatinine ratio 10 (L) 12 - 20      GFR est AA >60 >60 ml/min/1.73m2    GFR est non-AA >60 >60 ml/min/1.73m2    Calcium 9.3 8.5 - 10.1 MG/DL    Bilirubin, total 1.1 (H) 0.2 - 1.0 MG/DL    ALT (SGPT) 38 16 - 61 U/L    AST (SGOT) 19 10 - 38 U/L    Alk. phosphatase 65 45 - 117 U/L    Protein, total 7.5 6.4 - 8.2 g/dL    Albumin 3.6 3.4 - 5.0 g/dL    Globulin 3.9 2.0 - 4.0 g/dL    A-G Ratio 0.9 0.8 - 1.7     LIPASE    Collection Time: 03/30/22 11:25 PM   Result Value Ref Range    Lipase 53 (L) 73 - 393 U/L   D DIMER    Collection Time: 03/30/22 11:25 PM   Result Value Ref Range    D DIMER 1.87 (H) <0.46 ug/ml(FEU)   TROPONIN-HIGH SENSITIVITY    Collection Time: 03/30/22 11:25 PM   Result Value Ref Range    Troponin-High Sensitivity 4 0 - 78 ng/L   EKG, 12 LEAD, INITIAL    Collection Time: 03/30/22 11:37 PM   Result Value Ref Range    Ventricular Rate 86 BPM    Atrial Rate 86 BPM    P-R Interval 164 ms    QRS Duration 86 ms    Q-T Interval 336 ms    QTC Calculation (Bezet) 402 ms    Calculated P Axis 56 degrees    Calculated R Axis 64 degrees    Calculated T Axis 50 degrees    Diagnosis       Normal sinus rhythm  Normal ECG  No previous ECGs available     CBC WITH AUTOMATED DIFF    Collection Time: 03/31/22  2:28 AM   Result Value Ref Range    WBC 12.7 4.6 - 13.2 K/uL    RBC 4.32 (L) 4.35 - 5.65 M/uL    HGB 13.8 13.0 - 16.0 g/dL    HCT 40.2 36.0 - 48.0 %    MCV 93.1 78.0 - 100.0 FL    MCH 31.9 24.0 - 34.0 PG    MCHC 34.3 31.0 - 37.0 g/dL    RDW 12.2 11.6 - 14.5 %    PLATELET 857 086 - 555 K/uL    MPV 9.1 (L) 9.2 - 11.8 FL    NRBC 0.0 0  WBC    ABSOLUTE NRBC 0.00 0.00 - 0.01 K/uL    NEUTROPHILS 73 40 - 73 %    LYMPHOCYTES 13 (L) 21 - 52 %    MONOCYTES 13 (H) 3 - 10 %    EOSINOPHILS 0 0 - 5 %    BASOPHILS 0 0 - 2 %    IMMATURE GRANULOCYTES 0 0.0 - 0.5 %    ABS. NEUTROPHILS 9.2 (H) 1.8 - 8.0 K/UL    ABS.  LYMPHOCYTES 1.7 0.9 - 3.6 K/UL ABS. MONOCYTES 1.7 (H) 0.05 - 1.2 K/UL    ABS. EOSINOPHILS 0.0 0.0 - 0.4 K/UL    ABS. BASOPHILS 0.0 0.0 - 0.1 K/UL    ABS. IMM. GRANS. 0.1 (H) 0.00 - 0.04 K/UL    DF AUTOMATED     PTT    Collection Time: 03/31/22  2:28 AM   Result Value Ref Range    aPTT 28.6 23.0 - 36.4 SEC       Assessment & Plan:   35 y.o. male now admitted with B/L PE's with likely PLL pulmonary infarct. 1. Bilateral Pulmonary Embolism with Likely RLL Pulmonary Infarct vs superimposed infection   Arrived with SOB, RUQ pain with radiation to right shoulder, EKG NSR, troponins 4 WNL, less likely cardiac. Lipase 53 WNL unlikely pancreatitis, also no N/V or diarrhea, RUQ US normal and with neg murphys which makes cholecystitis unlikely, CT abd pelvis non-acute, Dimer elevated to 1.87, CTA chest: B/L acute PE's with moderate clot burden in RLL along with patchy consolidation concerning for infarcts vs superimposed infectious infiltrate. CXR pending. Presentation and all features currently most likely 2/2 to acute B/L PE's with RLL infarct. Though he does report a purely subjective fever doubt infectious component as pt denies sick contacts, coughing, runny nose. Unclear whether this was provoked or unprovoked as physical exam did not reveal popliteal TTP or extremity swelling and pt denies hx or fmhx of hypercoagulability but suspect DVT as source given pt extended hx of prolonged immobility within his car. Plan:  - admit to medicine w/ tele  - daily CBC, CMP  - continue heparin gtt for now, can transition to PO NOAC later today  - f/u BLE PVLs  - Consider pulm consult for RLL infarct  - If PVLs are negative plan for hypercoagulability w/u  - PT/OT/CM  - Regular diet    2.  Homicidal Ideation  - patient endorsed homicidal ideations when transport team came to transport patient to floor  Plan:  - crisis management consult in the AM  - continue boarding in ED until crisis management evaluates patient in AM    [employer]:  - VS per unit routine  - supplemental O2 for sats < 92%  - incentive spirometer  - PT/OT/CM    Diet  Regular   DVT Prophylaxis  Heparin gtt   GI Prophylaxis     Code status  Full   Disposition  TBD     Point of Contact Rosario Singh  Relationship: Mother  [phone]      Sangeetha Yan MD , PGY-1   [facility]   March 31, 2022, 3:00 AM        I have also seen and independently evaluated the patient. I agree with the plan as noted above.   Maykel Centeno MD  Resident Physician   [facility]   March 31, 2022

## 2022-03-31 NOTE — ED PROVIDER NOTES
EMERGENCY DEPARTMENT HISTORY AND PHYSICAL EXAM    10:56 PM patient seen at this time and EMS stretcher in [facility] to expedite care      Date: 3/30/2022  Patient Name: Mitzy Paz    History of Presenting Illness     Chief Complaint   Patient presents with    Flank Pain    Shoulder Pain         History Provided By: patient    Additional History (Context): Mitzy Paz is a 35 y.o. male presents with woke up this morning with severe right upper quadrant and right-sided chest pain that radiates to his right scapula area worse with breathing any position change. He is splinting his breaths. .  He did belch took some gas pills. No bowel movement no passing of flatus. Pain is severe. Constant, does not ease. Jered Mack PCP: Rylie Newman MD    Chief Complaint:   Duration:    Timing:    Location:   Quality:   Severity:   Modifying Factors:   Associated Symptoms:       Current Facility-Administered Medications   Medication Dose Route Frequency Provider Last Rate Last Admin    heparin 25,000 units in  ml infusion  18-36 Units/kg/hr IntraVENous TITRATE Fred Shah MD        heparin (porcine) 1,000 unit/mL injection 7,620 Units  80 Units/kg IntraVENous ONCE Rob Mccarty MD         Current Outpatient Medications   Medication Sig Dispense Refill    triamcinolone (ARISTOCORT) 0.5 % topical cream APPLY TO AFFECTED AREA TWICE DAILY. USE A THIN LAYER. 15 g 0       Past History     Past Medical History:  History reviewed. No pertinent past medical history. Past Surgical History:  History reviewed. No pertinent surgical history. Family History:  History reviewed. No pertinent family history. Social History:  Social History     Tobacco Use    Smoking status: Current Every Day Smoker    Smokeless tobacco: Not on file   Substance Use Topics    Alcohol use:  Yes    Drug use: No       Allergies:  No Known Allergies      Review of Systems     Review of Systems   Constitutional: Negative for diaphoresis and fever. HENT: Negative for congestion and sore throat. Eyes: Negative for pain and itching. Respiratory: Negative for cough and shortness of breath. Cardiovascular: Positive for chest pain. Negative for palpitations. Gastrointestinal: Positive for abdominal pain. Negative for diarrhea. Endocrine: Negative for polydipsia and polyuria. Genitourinary: Negative for dysuria and hematuria. Musculoskeletal: Negative for arthralgias and myalgias. Skin: Negative for rash and wound. Neurological: Negative for seizures and syncope. Hematological: Does not bruise/bleed easily. Psychiatric/Behavioral: Negative for agitation and hallucinations. Physical Exam       Patient Vitals for the past 12 hrs:   Temp Pulse Resp BP SpO2   03/30/22 2355 -- -- -- 111/65 93 %   03/30/22 2330 -- -- -- 124/70 94 %   03/30/22 2329 99.6 °F (37.6 °C) 84 18 -- --   03/30/22 2329 -- -- -- -- 94 %   03/30/22 2314 -- -- -- 127/80 --       IPVITALS  Patient Vitals for the past 24 hrs:   BP Temp Pulse Resp SpO2 Height Weight   03/30/22 2355 111/65 -- -- -- 93 % -- --   03/30/22 2330 124/70 -- -- -- 94 % -- --   03/30/22 2329 -- 99.6 °F (37.6 °C) 84 18 -- 6' 2\" (1.88 m) 95.3 kg (210 lb)   03/30/22 2329 -- -- -- -- 94 % -- --   03/30/22 2314 127/80 -- -- -- -- -- --       Physical Exam  Vitals and nursing note reviewed. Constitutional:       General: He is in acute distress. Appearance: Normal appearance. He is well-developed. HENT:      Head: Normocephalic and atraumatic. Eyes:      General: No scleral icterus. Conjunctiva/sclera: Conjunctivae normal.   Neck:      Vascular: No JVD. Cardiovascular:      Rate and Rhythm: Normal rate and regular rhythm. Heart sounds: Normal heart sounds. Comments: 4 intact extremity pulses  Pulmonary:      Effort: Pulmonary effort is normal.      Breath sounds: Normal breath sounds. Abdominal:      Palpations: Abdomen is soft. There is no mass. Tenderness: There is abdominal tenderness (Right upper quadrant and right lower rib cage tenderness). Comments: Voluntary guarding   Musculoskeletal:         General: Normal range of motion. Cervical back: Normal range of motion and neck supple. Lymphadenopathy:      Cervical: No cervical adenopathy. Skin:     General: Skin is warm and dry. Neurological:      Mental Status: He is alert. Diagnostic Study Results   Labs -  Recent Results (from the past 24 hour(s))   CBC WITH AUTOMATED DIFF    Collection Time: 03/30/22 11:25 PM   Result Value Ref Range    WBC 12.9 4.6 - 13.2 K/uL    RBC 4.70 4.35 - 5.65 M/uL    HGB 15.0 13.0 - 16.0 g/dL    HCT 43.6 36.0 - 48.0 %    MCV 92.8 78.0 - 100.0 FL    MCH 31.9 24.0 - 34.0 PG    MCHC 34.4 31.0 - 37.0 g/dL    RDW 12.2 11.6 - 14.5 %    PLATELET 680 346 - 473 K/uL    MPV 9.1 (L) 9.2 - 11.8 FL    NRBC 0.0 0  WBC    ABSOLUTE NRBC 0.00 0.00 - 0.01 K/uL    NEUTROPHILS 77 (H) 40 - 73 %    LYMPHOCYTES 13 (L) 21 - 52 %    MONOCYTES 10 3 - 10 %    EOSINOPHILS 0 0 - 5 %    BASOPHILS 0 0 - 2 %    IMMATURE GRANULOCYTES 0 0.0 - 0.5 %    ABS. NEUTROPHILS 9.9 (H) 1.8 - 8.0 K/UL    ABS. LYMPHOCYTES 1.7 0.9 - 3.6 K/UL    ABS. MONOCYTES 1.3 (H) 0.05 - 1.2 K/UL    ABS. EOSINOPHILS 0.0 0.0 - 0.4 K/UL    ABS. BASOPHILS 0.0 0.0 - 0.1 K/UL    ABS. IMM.  GRANS. 0.0 0.00 - 0.04 K/UL    DF MANUAL      PLATELET COMMENTS ADEQUATE PLATELETS      RBC COMMENTS NORMOCYTIC, NORMOCHROMIC     METABOLIC PANEL, COMPREHENSIVE    Collection Time: 03/30/22 11:25 PM   Result Value Ref Range    Sodium 136 136 - 145 mmol/L    Potassium 3.9 3.5 - 5.5 mmol/L    Chloride 103 100 - 111 mmol/L    CO2 27 21 - 32 mmol/L    Anion gap 6 3.0 - 18 mmol/L    Glucose 116 (H) 74 - 99 mg/dL    BUN 12 7.0 - 18 MG/DL    Creatinine 1.19 0.6 - 1.3 MG/DL    BUN/Creatinine ratio 10 (L) 12 - 20      GFR est AA >60 >60 ml/min/1.73m2    GFR est non-AA >60 >60 ml/min/1.73m2    Calcium 9.3 8.5 - 10.1 MG/DL Bilirubin, total 1.1 (H) 0.2 - 1.0 MG/DL    ALT (SGPT) 38 16 - 61 U/L    AST (SGOT) 19 10 - 38 U/L    Alk. phosphatase 65 45 - 117 U/L    Protein, total 7.5 6.4 - 8.2 g/dL    Albumin 3.6 3.4 - 5.0 g/dL    Globulin 3.9 2.0 - 4.0 g/dL    A-G Ratio 0.9 0.8 - 1.7     LIPASE    Collection Time: 03/30/22 11:25 PM   Result Value Ref Range    Lipase 53 (L) 73 - 393 U/L   D DIMER    Collection Time: 03/30/22 11:25 PM   Result Value Ref Range    D DIMER 1.87 (H) <0.46 ug/ml(FEU)   TROPONIN-HIGH SENSITIVITY    Collection Time: 03/30/22 11:25 PM   Result Value Ref Range    Troponin-High Sensitivity 4 0 - 78 ng/L   EKG, 12 LEAD, INITIAL    Collection Time: 03/30/22 11:37 PM   Result Value Ref Range    Ventricular Rate 86 BPM    Atrial Rate 86 BPM    P-R Interval 164 ms    QRS Duration 86 ms    Q-T Interval 336 ms    QTC Calculation (Bezet) 402 ms    Calculated P Axis 56 degrees    Calculated R Axis 64 degrees    Calculated T Axis 50 degrees    Diagnosis       Normal sinus rhythm  Normal ECG  No previous ECGs available         Radiologic Studies -   CTA CHEST W OR W WO CONT   Final Result      Bilateral right greater than left lower lobe acute appearing pulmonary emboli,   moderate burden in the right lower lobe. No specific evidence of right heart   strain. Right lower lobe patchy consolidation, likely at least partly due to infarcts in   the setting, but superimposed infectious infiltrate not excluded.   -Left extensive hazy streaky densities at the left lower lobe. Trace right pleural effusion. Significant findings called to Dr. Roxanne Lewis at 0145 hours, 3/31/2022. Heterogeneous thyroid with the better characterized by ultrasound. CT ABD PELV W CONT   Final Result      No acute findings in abdomen or pelvis. See same day CTA chest report.       XR CHEST PORT    (Results Pending)   US ABD LTD    (Results Pending)     CTA CHEST W OR W WO CONT    Result Date: 3/31/2022  EXAMINATION: CTA chest pulmonary INDICATION: Right upper quadrant pain COMPARISON: None TECHNIQUE: CT angiography of the pulmonary arteries performed with IV contrast. 3-D MIP reformations obtained. All CT scans at this facility are performed using dose optimization technique as appropriate to a performed exam, to include automated exposure control, adjustment of the mA and/or kV according to patient size (including appropriate matching first site specific examinations), or use of iterative reconstruction technique. FINDINGS: Multiple pulmonary artery filling defects identified in the lower lobes, right more than left, up to distal lobar artery the right lower lobe extending into segmental and subsegmental arteries, with a few likely occlusive defects in the right lower lobe. No evidence of right heart strain. Cardiovascular: Thoracic aorta unremarkable. Heart size normal. Mediastinum: Thyroid slightly heterogeneous. No adenopathy by size criteria. Esophagus nondistended. Pleura: Trace right pleural effusion. No pneumothorax. Lungs/airways: No suspicious focal bronchial lesions. Patchy consolidation in the right lower lobe, and left lower lobe with hazy streaky densities. Additional likely streaky atelectasis at the lung bases. Miscellaneous: Superficial soft tissues unremarkable. Bones: No acute osseous findings. Bilateral right greater than left lower lobe acute appearing pulmonary emboli, moderate burden in the right lower lobe. No specific evidence of right heart strain. Right lower lobe patchy consolidation, likely at least partly due to infarcts in the setting, but superimposed infectious infiltrate not excluded. -Left extensive hazy streaky densities at the left lower lobe. Trace right pleural effusion. Significant findings called to Dr. Alta Merlin at 0145 hours, 3/31/2022. Heterogeneous thyroid with the better characterized by ultrasound.     CT ABD PELV W CONT    Result Date: 3/31/2022  EXAMINATION: CT abdomen/pelvis with contrast INDICATION: Right upper quadrant pain COMPARISON: None TECHNIQUE: CT of the abdomen pelvis with contrast with multiplanar reformations. All CT scans at this facility are performed using dose optimization technique as appropriate to a performed exam, to include automated exposure control, adjustment of the mA and/or kV according to patient size (including appropriate matching first site specific examinations), or use of iterative reconstruction technique. FINDINGS: Lower chest: See same day chest CT. Hepatobiliary: Liver unremarkable. Gallbladder unremarkable. No biliary duct dilatation. Pancreas: Unremarkable. Adrenals: Unremarkable. Genitourinary: -Right kidney: Unremarkable. -Left kidney: Tiny likely subcentimeter cyst in the mid kidney. In the hydronephrosis. -Bladder/reproductive: Bladder unremarkable. Prostate unremarkable. Gastrointestinal: Stomach unremarkable. Small bowel loops nondilated. The colon is nondilated. Appendix within normal limits. Mesentery/vessels/nodes: No free air. No free fluid. Major vessels unremarkable. No adenopathy by size criteria. Miscellaneous: Superficial soft tissues unremarkable. Bones: No acute osseous findings. No acute findings in abdomen or pelvis. See same day CTA chest report. Medications ordered:   Medications   heparin 25,000 units in  ml infusion (has no administration in time range)   heparin (porcine) 1,000 unit/mL injection 7,620 Units (has no administration in time range)   ketorolac (TORADOL) injection 15 mg (15 mg IntraVENous Given 3/30/22 2332)   iopamidoL (ISOVUE-370) 76 % injection  mL (100 mL IntraVENous Given 3/31/22 0128)         Medical Decision Making   Initial Medical Decision Making and DDx:  Wide list of considerations right pneumothorax right PE cholecystitis even kidney stone is likely though he denies posterior pain. Peptic ulcer disease duodenal ulcer.   Could be bruised ribs or rib muscle strain from rolling over in bed as well    ED Course: Progress Notes, Reevaluation, and Consults:     2:04 AM appreciate call from radiology patient with pulmonary embolisms bilaterally possibly an infarct. Heparin drip started discussed with the patient and his mother. Patient is really uninterested at this point and does not ask questions and goes back to sleep. Thorough discussion with his mother. Discussed with Dr. Tura Lefort hospitalist will admit. 30 minutes critical care time management of acute pulmonary embolisms requiring IV medications and oxygen. 2:13 AM d/w Dr Sarmad Elizabeth on for [facility] since the patient's primary is part of that practice. She accepts for admission. 4:37 AM tech informs me patient is refusing to go upstairs I discussed with the patient initially he has a very cogent argument that he is going to get another bill for this admission where as he has VA benefits. I offered transfer over to the [place] he shrugged indicating he did not want to pursue this, then he says \"this on top of living in my truck, and [address] I am going to kill them all. \"  He is calm pleasant and cooperative at this time we will have psychiatry see him in the morning  Called and updated Dr. Sarmad Elizabeth with [facility]. 5:13 AM patient made similar statements to nurse Brandi Kate and Dr. Shirin Baron from [facility]. I have a second encounter with the patient probed more his thought process he simply asks questions back to me and insist that I answer them. Rather tangential thought process at times he denies wanting to kill people. He does refer to being locked up because he gets labeled crazy. Ultimately I do not think I have grounds to apply for emergency custody order at this time. Called [place] police dispatch for police to contact him    I am the first provider for this patient.     I reviewed the vital signs, available nursing notes, past medical history, past surgical history, family history and social history. Patient Vitals for the past 12 hrs:   Temp Pulse Resp BP SpO2   03/30/22 2355 -- -- -- 111/65 93 %   03/30/22 2330 -- -- -- 124/70 94 %   03/30/22 2329 99.6 °F (37.6 °C) 84 18 -- --   03/30/22 2329 -- -- -- -- 94 %   03/30/22 2314 -- -- -- 127/80 --       Vital Signs-Reviewed the patient's vital signs. Pulse Oximetry Analysis, Cardiac Monitor, 12 lead ekg:      Interpreted by the EP. Records Reviewed: Nursing notes reviewed (Time of Review: 10:56 PM)    Procedures:   Critical Care Time:   Aspirin: (was aspirin given for stroke?)    Diagnosis     Clinical Impression:   1. Multiple subsegmental pulmonary emboli without acute cor pulmonale (HCC)        Disposition: AMA      Follow-up Information    None          Patient's Medications   Start Taking    No medications on file   Continue Taking    TRIAMCINOLONE (ARISTOCORT) 0.5 % TOPICAL CREAM    APPLY TO AFFECTED AREA TWICE DAILY. USE A THIN LAYER.    These Medications have changed    No medications on file   Stop Taking    No medications on file     _______________________________    Notes:    Bimal Knowles MD using Dragon dictation      _______________________________

## 2022-03-31 NOTE — ROUTINE PROCESS
TRANSFER - OUT REPORT:    Verbal report given to Sena Fernando RN(name) on Mitzy Paz  being transferred to  Rm 219(unit) for routine progression of care       Report consisted of patients Situation, Background, Assessment and   Recommendations(SBAR). Information from the following report(s) SBAR, ED Summary, [facility] and Recent Results was reviewed with the receiving nurse. Lines:   Peripheral IV 03/30/22 Right Antecubital (Active)   Site Assessment Clean, dry, & intact 03/30/22 2325   Phlebitis Assessment 0 03/30/22 2325   Infiltration Assessment 0 03/30/22 2325   Dressing Status Clean, dry, & intact 03/30/22 2325   Dressing Type Transparent 03/30/22 2325   Hub Color/Line Status Patent; Flushed;Pink 03/30/22 2325        Opportunity for questions and clarification was provided.       Patient transported with:   [employer]

## 2022-11-20 ENCOUNTER — APPOINTMENT (OUTPATIENT)
Dept: GENERAL RADIOLOGY | Age: 42
End: 2022-11-20
Attending: EMERGENCY MEDICINE
Payer: MEDICAID

## 2022-11-20 ENCOUNTER — HOSPITAL ENCOUNTER (EMERGENCY)
Age: 42
Discharge: HOME | End: 2022-11-20
Attending: EMERGENCY MEDICINE
Payer: MEDICAID

## 2022-11-20 VITALS
SYSTOLIC BLOOD PRESSURE: 138 MMHG | DIASTOLIC BLOOD PRESSURE: 78 MMHG | OXYGEN SATURATION: 100 % | RESPIRATION RATE: 16 BRPM | TEMPERATURE: 97.4 F | HEART RATE: 100 BPM

## 2022-11-20 DIAGNOSIS — R07.89 ATYPICAL CHEST PAIN: Primary | ICD-10-CM

## 2022-11-20 LAB
ALBUMIN SERPL-MCNC: 3.9 G/DL (ref 3.4–5)
ALBUMIN/GLOB SERPL: 1.1 {RATIO} (ref 0.8–1.7)
ALP SERPL-CCNC: 91 U/L (ref 45–117)
ALT SERPL-CCNC: 26 U/L (ref 16–61)
ANION GAP SERPL CALC-SCNC: 2 MMOL/L (ref 3–18)
AST SERPL-CCNC: 12 U/L (ref 10–38)
ATRIAL RATE: 100 BPM
BASOPHILS # BLD: 0 K/UL (ref 0–0.1)
BASOPHILS NFR BLD: 0 % (ref 0–2)
BILIRUB SERPL-MCNC: 0.7 MG/DL (ref 0.2–1)
BUN SERPL-MCNC: 18 MG/DL (ref 7–18)
BUN/CREAT SERPL: 14 (ref 12–20)
CALCIUM SERPL-MCNC: 9.1 MG/DL (ref 8.5–10.1)
CALCULATED P AXIS, ECG09: 80 DEGREES
CALCULATED R AXIS, ECG10: 84 DEGREES
CALCULATED T AXIS, ECG11: 4 DEGREES
CHLORIDE SERPL-SCNC: 109 MMOL/L (ref 100–111)
CHOLEST SERPL-MCNC: 186 MG/DL
CO2 SERPL-SCNC: 29 MMOL/L (ref 21–32)
CREAT SERPL-MCNC: 1.28 MG/DL (ref 0.6–1.3)
DIAGNOSIS, 93000: NORMAL
DIFFERENTIAL METHOD BLD: ABNORMAL
EOSINOPHIL # BLD: 0.1 K/UL (ref 0–0.4)
EOSINOPHIL NFR BLD: 2 % (ref 0–5)
ERYTHROCYTE [DISTWIDTH] IN BLOOD BY AUTOMATED COUNT: 13.2 % (ref 11.6–14.5)
GLOBULIN SER CALC-MCNC: 3.4 G/DL (ref 2–4)
GLUCOSE SERPL-MCNC: 91 MG/DL (ref 74–99)
HCT VFR BLD AUTO: 49.1 % (ref 36–48)
HDLC SERPL-MCNC: 37 MG/DL (ref 40–60)
HDLC SERPL: 5 {RATIO} (ref 0–5)
HGB BLD-MCNC: 16.6 G/DL (ref 13–16)
IMM GRANULOCYTES # BLD AUTO: 0 K/UL (ref 0–0.04)
IMM GRANULOCYTES NFR BLD AUTO: 0 % (ref 0–0.5)
LDLC SERPL CALC-MCNC: 126 MG/DL (ref 0–100)
LIPID PROFILE,FLP: ABNORMAL
LYMPHOCYTES # BLD: 1.7 K/UL (ref 0.9–3.6)
LYMPHOCYTES NFR BLD: 33 % (ref 21–52)
MCH RBC QN AUTO: 32.4 PG (ref 24–34)
MCHC RBC AUTO-ENTMCNC: 33.8 G/DL (ref 31–37)
MCV RBC AUTO: 95.7 FL (ref 78–100)
MONOCYTES # BLD: 0.6 K/UL (ref 0.05–1.2)
MONOCYTES NFR BLD: 13 % (ref 3–10)
NEUTS SEG # BLD: 2.7 K/UL (ref 1.8–8)
NEUTS SEG NFR BLD: 53 % (ref 40–73)
NRBC # BLD: 0 K/UL (ref 0–0.01)
NRBC BLD-RTO: 0 PER 100 WBC
P-R INTERVAL, ECG05: 148 MS
PLATELET # BLD AUTO: 233 K/UL (ref 135–420)
PMV BLD AUTO: 8.8 FL (ref 9.2–11.8)
POTASSIUM SERPL-SCNC: 3.8 MMOL/L (ref 3.5–5.5)
PROT SERPL-MCNC: 7.3 G/DL (ref 6.4–8.2)
Q-T INTERVAL, ECG07: 318 MS
QRS DURATION, ECG06: 90 MS
QTC CALCULATION (BEZET), ECG08: 410 MS
RBC # BLD AUTO: 5.13 M/UL (ref 4.35–5.65)
SODIUM SERPL-SCNC: 140 MMOL/L (ref 136–145)
TRIGL SERPL-MCNC: 115 MG/DL (ref ?–150)
TROPONIN-HIGH SENSITIVITY: 11 NG/L (ref 0–78)
VENTRICULAR RATE, ECG03: 100 BPM
VLDLC SERPL CALC-MCNC: 23 MG/DL
WBC # BLD AUTO: 5.1 K/UL (ref 4.6–13.2)

## 2022-11-20 PROCEDURE — 93005 ELECTROCARDIOGRAM TRACING: CPT

## 2022-11-20 PROCEDURE — 71046 X-RAY EXAM CHEST 2 VIEWS: CPT

## 2022-11-20 PROCEDURE — 87661 TRICHOMONAS VAGINALIS AMPLIF: CPT

## 2022-11-20 PROCEDURE — 80053 COMPREHEN METABOLIC PANEL: CPT

## 2022-11-20 PROCEDURE — 85025 COMPLETE CBC W/AUTO DIFF WBC: CPT

## 2022-11-20 PROCEDURE — 99285 EMERGENCY DEPT VISIT HI MDM: CPT

## 2022-11-20 PROCEDURE — 80061 LIPID PANEL: CPT

## 2022-11-20 PROCEDURE — 84484 ASSAY OF TROPONIN QUANT: CPT

## 2022-11-20 PROCEDURE — 87491 CHLMYD TRACH DNA AMP PROBE: CPT

## 2022-11-20 PROCEDURE — 74011250637 HC RX REV CODE- 250/637: Performed by: EMERGENCY MEDICINE

## 2022-11-20 RX ORDER — NAPROXEN 500 MG/1
500 TABLET ORAL 2 TIMES DAILY WITH MEALS
Qty: 20 TABLET | Refills: 0 | Status: SHIPPED | OUTPATIENT
Start: 2022-11-20 | End: 2022-11-30

## 2022-11-20 RX ORDER — GUAIFENESIN 100 MG/5ML
324 LIQUID (ML) ORAL DAILY
Status: DISCONTINUED | OUTPATIENT
Start: 2022-11-20 | End: 2022-11-20 | Stop reason: HOSPADM

## 2022-11-20 RX ADMIN — ASPIRIN 81 MG 324 MG: 81 TABLET ORAL at 11:42

## 2022-11-20 NOTE — ED PROVIDER NOTES
EMERGENCY DEPARTMENT HISTORY AND PHYSICAL EXAM    Date: 11/20/2022  Patient Name: Janell Miller    History of Presenting Illness     Chief Complaint   Patient presents with    Chest Pain     X2 days         History Provided By:     Chief Complaint:  Duration:   Timing:    Location:   Quality:   Severity:   Modifying Factors:   Associated Symptoms: none       Additional History (Context): Janell Miller is a 38 y.o. male with a history of anxiety who presents to the emergency department with complaints of intermittent chest pain for the past 3 days. He states the pain will come on at random, lasts for 1 to 2 minutes before resolving. He states he does not have any associated symptoms such as shortness of breath, nausea, vomiting or sweating. He states he has not had pain similar to this in the past, denies any prior history of coronary artery disease or congestive heart failure. He states that there is nothing that seems to alleviate the pain, it just resolves spontaneously, nothing seems to bring the pain on. He states that he has had pain while at rest and during exertion. Patient also states that he is concerned he may have sexually transmitted disease. States that he had some discharge a couple of days ago but has not had any dysuria or continued discharge. Denies known infected exposures. PCP: Nolan Mcgrath    Current Facility-Administered Medications   Medication Dose Route Frequency Provider Last Rate Last Admin    aspirin chewable tablet 324 mg  324 mg Oral DAILY Teena John MD   324 mg at 11/20/22 1142     Current Outpatient Medications   Medication Sig Dispense Refill    naproxen (Naprosyn) 500 mg tablet Take 1 Tablet by mouth two (2) times daily (with meals) for 10 days. 20 Tablet 0       Past History     Past Medical History:  No past medical history on file. Past Surgical History:  No past surgical history on file. Family History:  No family history on file.     Social History:  Social History     Tobacco Use    Smoking status: Every Day   Substance Use Topics    Alcohol use: Yes    Drug use: No       Allergies:  No Known Allergies      Review of Systems   Review of Systems   Constitutional:  Negative for chills, diaphoresis and fever. HENT:  Negative for congestion, hearing loss, rhinorrhea and sore throat. Eyes:  Negative for visual disturbance. Respiratory:  Negative for cough and shortness of breath. Cardiovascular:  Positive for chest pain. Negative for palpitations and leg swelling. Gastrointestinal:  Negative for abdominal pain, diarrhea, nausea and vomiting. Genitourinary:  Negative for dysuria, frequency and hematuria. Musculoskeletal:  Negative for gait problem. Skin:  Negative for rash. Neurological:  Negative for weakness, light-headedness and headaches. Psychiatric/Behavioral:  Negative for confusion. All other systems reviewed and are negative. All Other Systems Negative  Physical Exam     Vitals:    11/20/22 1036   BP: 138/78   Pulse: 100   Resp: 16   Temp: 97.4 °F (36.3 °C)   SpO2: 100%     Physical Exam  Vitals and nursing note reviewed. Constitutional:       Appearance: Normal appearance. He is normal weight. HENT:      Head: Normocephalic and atraumatic. Right Ear: External ear normal.      Left Ear: External ear normal.      Nose: Nose normal.      Mouth/Throat:      Pharynx: Oropharynx is clear. Eyes:      Extraocular Movements: Extraocular movements intact. Conjunctiva/sclera: Conjunctivae normal.   Cardiovascular:      Rate and Rhythm: Normal rate and regular rhythm. Pulses: Normal pulses. Radial pulses are 2+ on the right side and 2+ on the left side. Heart sounds: Normal heart sounds. Heart sounds not distant. No murmur heard. Pulmonary:      Effort: Pulmonary effort is normal.      Breath sounds: Normal breath sounds. Abdominal:      General: Abdomen is flat.  Bowel sounds are normal. Palpations: Abdomen is soft. Musculoskeletal:         General: Normal range of motion. Cervical back: Normal range of motion and neck supple. Skin:     General: Skin is warm and dry. Capillary Refill: Capillary refill takes less than 2 seconds. Neurological:      General: No focal deficit present. Mental Status: He is alert and oriented to person, place, and time. Mental status is at baseline. Psychiatric:         Mood and Affect: Mood normal.         Behavior: Behavior normal.         Thought Content:  Thought content normal.         Judgment: Judgment normal.         Diagnostic Study Results     Labs -     Recent Results (from the past 12 hour(s))   EKG, 12 LEAD, INITIAL    Collection Time: 11/20/22 10:28 AM   Result Value Ref Range    Ventricular Rate 100 BPM    Atrial Rate 100 BPM    P-R Interval 148 ms    QRS Duration 90 ms    Q-T Interval 318 ms    QTC Calculation (Bezet) 410 ms    Calculated P Axis 80 degrees    Calculated R Axis 84 degrees    Calculated T Axis 4 degrees    Diagnosis       Normal sinus rhythm  Nonspecific T wave abnormality  Abnormal ECG  When compared with ECG of 30-MAR-2022 23:37,  T wave inversion now evident in Inferior leads  Confirmed by Ariel Xiong MD, Sandy Freeman ([id number]) on 11/20/2022 1:38:11 PM     LIPID PANEL    Collection Time: 11/20/22 10:33 AM   Result Value Ref Range    LIPID PROFILE          Cholesterol, total 186 <200 MG/DL    Triglyceride 115 <150 MG/DL    HDL Cholesterol 37 (L) 40 - 60 MG/DL    LDL, calculated 126 (H) 0 - 100 MG/DL    VLDL, calculated 23 MG/DL    CHOL/HDL Ratio 5.0 0 - 5.0     TROPONIN-HIGH SENSITIVITY    Collection Time: 11/20/22 10:33 AM   Result Value Ref Range    Troponin-High Sensitivity 11 0 - 78 ng/L   CBC WITH AUTOMATED DIFF    Collection Time: 11/20/22 10:33 AM   Result Value Ref Range    WBC 5.1 4.6 - 13.2 K/uL    RBC 5.13 4.35 - 5.65 M/uL    HGB 16.6 (H) 13.0 - 16.0 g/dL    HCT 49.1 (H) 36.0 - 48.0 %    MCV 95.7 78.0 - 100.0 FL    MCH 32.4 24.0 - 34.0 PG    MCHC 33.8 31.0 - 37.0 g/dL    RDW 13.2 11.6 - 14.5 %    PLATELET 576 512 - 992 K/uL    MPV 8.8 (L) 9.2 - 11.8 FL    NRBC 0.0 0  WBC    ABSOLUTE NRBC 0.00 0.00 - 0.01 K/uL    NEUTROPHILS 53 40 - 73 %    LYMPHOCYTES 33 21 - 52 %    MONOCYTES 13 (H) 3 - 10 %    EOSINOPHILS 2 0 - 5 %    BASOPHILS 0 0 - 2 %    IMMATURE GRANULOCYTES 0 0.0 - 0.5 %    ABS. NEUTROPHILS 2.7 1.8 - 8.0 K/UL    ABS. LYMPHOCYTES 1.7 0.9 - 3.6 K/UL    ABS. MONOCYTES 0.6 0.05 - 1.2 K/UL    ABS. EOSINOPHILS 0.1 0.0 - 0.4 K/UL    ABS. BASOPHILS 0.0 0.0 - 0.1 K/UL    ABS. IMM. GRANS. 0.0 0.00 - 0.04 K/UL    DF AUTOMATED     METABOLIC PANEL, COMPREHENSIVE    Collection Time: 11/20/22 10:33 AM   Result Value Ref Range    Sodium 140 136 - 145 mmol/L    Potassium 3.8 3.5 - 5.5 mmol/L    Chloride 109 100 - 111 mmol/L    CO2 29 21 - 32 mmol/L    Anion gap 2 (L) 3.0 - 18 mmol/L    Glucose 91 74 - 99 mg/dL    BUN 18 7.0 - 18 MG/DL    Creatinine 1.28 0.6 - 1.3 MG/DL    BUN/Creatinine ratio 14 12 - 20      eGFR >60 >60 ml/min/1.73m2    Calcium 9.1 8.5 - 10.1 MG/DL    Bilirubin, total 0.7 0.2 - 1.0 MG/DL    ALT (SGPT) 26 16 - 61 U/L    AST (SGOT) 12 10 - 38 U/L    Alk. phosphatase 91 45 - 117 U/L    Protein, total 7.3 6.4 - 8.2 g/dL    Albumin 3.9 3.4 - 5.0 g/dL    Globulin 3.4 2.0 - 4.0 g/dL    A-G Ratio 1.1 0.8 - 1.7         Radiologic Studies -   XR CHEST PA LAT   Final Result   No acute cardiopulmonary process. Thank you for your referral.        CT Results  (Last 48 hours)      None          CXR Results  (Last 48 hours)                 11/20/22 1039  XR CHEST PA LAT Final result    Impression:  No acute cardiopulmonary process. Thank you for your referral.       Narrative:  Chest PA and lateral views       HISTORY: chest pain       COMPARISON: 3/30/22       FINDINGS: The heart is normal in size. The lungs are clear of acute infiltration   or consolidation. The bilateral costophrenic angles are sharp.  The mediastinum, pulmonary vascularity and bony thorax appear unremarkable. Medical Decision Making   I am the first provider for this patient. I reviewed the vital signs, available nursing notes, past medical history, past surgical history, family history and social history. Vital Signs-Reviewed the patient's vital signs. Records Reviewed: Nursing Notes and Old Medical Records     Procedures: None   Procedures    Provider Notes (Medical Decision Making):   47-year-old man presenting with intermittent chest pain for the past few days. His pain is certainly atypical, initial EKG appears unremarkable, plan to check lab work, chest x-ray. If this is unremarkable, will discharge home. We will also screen for STIs. ED Course as of 11/20/22 1350   Sun Nov 20, 2022   1247 Updated patient on results of lab work, chest x-ray. Plan to discharge home. Patient to look of his STI results online. [JR]      ED Course User Index  [JR] Evon Wagner MD         MED RECONCILIATION:  Current Facility-Administered Medications   Medication Dose Route Frequency    aspirin chewable tablet 324 mg  324 mg Oral DAILY     Current Outpatient Medications   Medication Sig    naproxen (Naprosyn) 500 mg tablet Take 1 Tablet by mouth two (2) times daily (with meals) for 10 days. Disposition:  Home     DISCHARGE NOTE:   Pt has been reexamined. Patient has no new complaints, changes, or physical findings. Care plan outlined and precautions discussed. Results of workup were reviewed with the patient. All medications were reviewed with the patient. All of pt's questions and concerns were addressed. Patient was instructed and agrees to follow up with PCP as well as to return to the ED upon further deterioration. Patient is ready to go home.     Follow-up Information       Follow up With Specialties Details Why Contact Info    Sierra Kaminski Nurse Practitioner In 2 days  [address]              Discharge Medication List as of 11/20/2022 12:50 PM        START taking these medications    Details   naproxen (Naprosyn) 500 mg tablet Take 1 Tablet by mouth two (2) times daily (with meals) for 10 days. , Normal, Disp-20 Tablet, R-0                 Diagnosis     Clinical Impression:   1. Atypical chest pain          \"Please note that this dictation was completed with [email or web address], the computer voice recognition software. Quite often unanticipated grammatical, syntax, homophones, and other interpretive errors are inadvertently transcribed by the computer software. Please disregard these errors. Please excuse any errors that have escaped final proofreading. \"

## 2022-11-21 LAB
C TRACH RRNA SPEC QL NAA+PROBE: NEGATIVE
N GONORRHOEA RRNA SPEC QL NAA+PROBE: NEGATIVE
SPECIMEN SOURCE: NORMAL

## 2022-11-25 LAB
SPECIMEN SOURCE: NORMAL
T VAGINALIS RRNA SPEC QL NAA+PROBE: NEGATIVE

## 2022-12-08 ENCOUNTER — HOSPITAL ENCOUNTER (EMERGENCY)
Age: 42
Discharge: LEFT BEFORE DISCHARGE | End: 2022-12-08
Attending: EMERGENCY MEDICINE

## 2022-12-08 VITALS
RESPIRATION RATE: 16 BRPM | TEMPERATURE: 97.9 F | SYSTOLIC BLOOD PRESSURE: 123 MMHG | HEART RATE: 67 BPM | HEIGHT: 74 IN | BODY MASS INDEX: 24.38 KG/M2 | OXYGEN SATURATION: 99 % | WEIGHT: 190 LBS | DIASTOLIC BLOOD PRESSURE: 86 MMHG

## 2022-12-08 DIAGNOSIS — M25.569 KNEE PAIN, UNSPECIFIED CHRONICITY, UNSPECIFIED LATERALITY: Primary | ICD-10-CM

## 2022-12-08 NOTE — ED PROVIDER NOTES
The patient was called several times in the waiting lobby area of the emergency department. He did not respond. Patient left the emergency department prior to me have an opportunity to evaluate or interview him.
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
## 2023-03-18 ENCOUNTER — HOSPITAL ENCOUNTER (EMERGENCY)
Facility: HOSPITAL | Age: 43
Discharge: HOME | End: 2023-03-18
Attending: EMERGENCY MEDICINE
Payer: MEDICAID

## 2023-03-18 ENCOUNTER — APPOINTMENT (OUTPATIENT)
Facility: HOSPITAL | Age: 43
End: 2023-03-18
Payer: MEDICAID

## 2023-03-18 VITALS
HEART RATE: 61 BPM | SYSTOLIC BLOOD PRESSURE: 128 MMHG | DIASTOLIC BLOOD PRESSURE: 87 MMHG | RESPIRATION RATE: 16 BRPM | BODY MASS INDEX: 24.38 KG/M2 | OXYGEN SATURATION: 99 % | TEMPERATURE: 98.4 F | WEIGHT: 190 LBS | HEIGHT: 74 IN

## 2023-03-18 DIAGNOSIS — R07.89 ATYPICAL CHEST PAIN: Primary | ICD-10-CM

## 2023-03-18 PROCEDURE — 99283 EMERGENCY DEPT VISIT LOW MDM: CPT

## 2023-03-18 PROCEDURE — 71046 X-RAY EXAM CHEST 2 VIEWS: CPT

## 2023-03-18 ASSESSMENT — ENCOUNTER SYMPTOMS
BACK PAIN: 0
EYES NEGATIVE: 1
COUGH: 0
SHORTNESS OF BREATH: 0
ALLERGIC/IMMUNOLOGIC NEGATIVE: 1
ABDOMINAL PAIN: 0

## 2023-03-18 ASSESSMENT — PAIN - FUNCTIONAL ASSESSMENT: PAIN_FUNCTIONAL_ASSESSMENT: NONE - DENIES PAIN

## 2023-03-18 NOTE — ED NOTES
Pt called for blood draw at this time; No response in [address] [place], barron, RW, [employer] JENIFFERErick  Danielle Luis  03/18/23 1257

## 2023-03-18 NOTE — ED NOTES
Pt called for blood work, final time. Pt emerged from [facility], apparently from being asleep. When brought to triage room for testing, he stated he had had blood work just the week prior, and did not want to repeat. Still no EKG, and adamantly refusing Covid nasal swab. Pt not angry or aggressive. He stated  since his series of Covid vaccines he's had a \"junky\" chest, weakness, \"stupid stuff\" going on in his body, including an eczema-like rash on genitals and other parts of his body immediately following shots. Pt expressed disenchantment with standard medical tests available (cardiac blood work, etc.) and stated he didn't know what to do to get answers about his un-traditional health issues. Pt stated he did not want to speak with a doctor at this time. Leon Preston  03/18/23 8775

## 2023-03-18 NOTE — ED TRIAGE NOTES
Pt reports chest pain that started 3-4 days ago, worse at night and in the AM. Denies chest pain at this time. Also reports itchy penis, denies penile discharge or dysuria.

## 2023-03-18 NOTE — ED PROVIDER NOTES
[facility] EMERGENCY DEPT  EMERGENCY DEPARTMENT ENCOUNTER      Pt Name: Merary Bolton  MRN: [id number]  [place] 1980  Date of evaluation: 3/18/2023  Provider: RHODA Terry    CHIEF COMPLAINT       Chief Complaint   Patient presents with    Chest Pain    Exposure to STD         HISTORY OF PRESENT ILLNESS   (Location/Symptom, Timing/Onset, Context/Setting, Quality, Duration, Modifying Factors, Severity)  Note limiting factors. Merary Bolton is a 38 y.o. male who presents to the emergency department complaint of chest pain. He has had it for about 10 days it seems to be worse when he is lying flat. He said he gets chest pain sometime around 2:58 in the morning when he is lying in bed. He did also have chest pain while walking over here today. Denies any shortness of breath but is asking to see if there is a respiratory cleanser that he could have. Says he is frustrated that he does not seem to have access to healthcare even though he does have a primary care provider. He smokes buccal mild. Denies alcohol. Denies any illicits. Pain does not radiate from his left sternal border. Chest pain can last 1 to 2 hours. Patient also is reporting a slightly pruritic rash on his penis glans since yesterday. Is not sexually active. Denies pain. Has been diagnosed with eczema on his abdomen and upper back. HPI    Nursing Notes were reviewed. REVIEW OF SYSTEMS    (2-9 systems for level 4, 10 or more for level 5)     Review of Systems   Constitutional:  Negative for fatigue and fever. HENT: Negative. Eyes: Negative. Respiratory:  Negative for cough and shortness of breath. Cardiovascular:  Positive for chest pain. Gastrointestinal:  Negative for abdominal pain. Endocrine: Negative. Genitourinary:  Positive for genital sores. Negative for dysuria, hematuria, penile discharge, penile pain, penile swelling, scrotal swelling and testicular pain. Musculoskeletal:  Negative for back pain. Skin:  Positive for rash. Allergic/Immunologic: Negative. Neurological: Negative. Negative for dizziness, speech difficulty, weakness, light-headedness, numbness and headaches. Hematological:  Does not bruise/bleed easily. Psychiatric/Behavioral: Negative. Except as noted above the remainder of the review of systems was reviewed and negative. PAST MEDICAL HISTORY   No past medical history on file. SURGICAL HISTORY       Past Surgical History:   Procedure Laterality Date    ORTHOPEDIC SURGERY           CURRENT MEDICATIONS       There are no discharge medications for this patient. ALLERGIES     Eliquis [apixaban]    FAMILY HISTORY     No family history on file. SOCIAL HISTORY       Social History     Socioeconomic History    Marital status: Single   Tobacco Use    Smoking status: Every Day   Substance and Sexual Activity    Alcohol use: Yes    Drug use: No       SCREENINGS         [place] Coma Scale  Eye Opening: Spontaneous  Best Verbal Response: Oriented  Best Motor Response: Obeys commands  [place] Coma Scale Score: 15                     CIWA Assessment  BP: 128/87  Heart Rate: 61                 PHYSICAL EXAM    (up to 7 for level 4, 8 or more for level 5)     ED Triage Vitals [03/18/23 1121]   BP Temp Temp Source Heart Rate Resp SpO2 Height Weight   128/87 98.4 °F (36.9 °C) Oral 61 16 99 % 6' 2\" (1.88 m) 190 lb (86.2 kg)       Physical Exam  Vitals and nursing note reviewed. Constitutional:       General: He is not in acute distress. Appearance: Normal appearance. He is normal weight. He is not ill-appearing, toxic-appearing or diaphoretic. HENT:      Head: Normocephalic and atraumatic. Nose: Nose normal.      Mouth/Throat:      Mouth: Mucous membranes are moist.   Eyes:      Extraocular Movements: Extraocular movements intact. Cardiovascular:      Rate and Rhythm: Normal rate and regular rhythm. Pulses: Normal pulses.       Heart sounds: Normal heart sounds. Pulmonary:      Effort: Pulmonary effort is normal.      Breath sounds: Normal breath sounds. No wheezing or rales. Abdominal:      General: Abdomen is flat. Palpations: Abdomen is soft. Tenderness: There is no abdominal tenderness. Genitourinary:     Comments: On dorsal surface of glans penis border there is a tiny unroofed area of skin color change perhaps 4 mm diameter. Musculoskeletal:         General: No deformity or signs of injury. Normal range of motion. Cervical back: Normal range of motion and neck supple. No rigidity. Skin:     General: Skin is warm. Findings: Rash present. Comments: Plaque-like area of thickened skin on lower abdomen in the suprapubic area approximately 6 x 5 cm. Neurological:      Mental Status: He is alert and oriented to person, place, and time. Cranial Nerves: No cranial nerve deficit. Sensory: No sensory deficit. Motor: No weakness. Coordination: Coordination normal.      Gait: Gait normal.   Psychiatric:         Mood and Affect: Mood normal.         Behavior: Behavior normal.         Thought Content: Thought content normal.         Judgment: Judgment normal.       DIAGNOSTIC RESULTS     EKG: All EKG's are interpreted by the Emergency Department Physician who either signs or Co-signs this chart in the absence of a cardiologist.        RADIOLOGY:   Non-plain film images such as CT, Ultrasound and MRI are read by the radiologist. Plain radiographic images are visualized and preliminarily interpreted by the emergency physician with the below findings:        Interpretation per the Radiologist below, if available at the time of this note:    XR CHEST (2 VW)   Final Result      No acute or active disease evident.                ED BEDSIDE ULTRASOUND:   Performed by ED Physician - none    LABS:  Labs Reviewed   COVID-19 & INFLUENZA COMBO   CBC WITH AUTO DIFFERENTIAL   COMPREHENSIVE METABOLIC PANEL   MAGNESIUM   TROPONIN BRAIN NATRIURETIC PEPTIDE   LIPASE       All other labs were within normal range or not returned as of this dictation. EMERGENCY DEPARTMENT COURSE and DIFFERENTIAL DIAGNOSIS/MDM:   Vitals:    Vitals:    03/18/23 1121   BP: 128/87   Pulse: 61   Resp: 16   Temp: 98.4 °F (36.9 °C)   TempSrc: Oral   SpO2: 99%   Weight: 190 lb (86.2 kg)   Height: 6' 2\" (1.88 m)         Medical Decision Making  Amount and/or Complexity of Data Reviewed  Labs: ordered. Radiology: ordered. ECG/medicine tests: ordered. Patient was refusing labs and an EKG. He eloped. Called his mobile phone number but it is not a working number. REASSESSMENT        FINAL IMPRESSION      1. Atypical chest pain          DISPOSITION/PLAN   DISPOSITION Decision To Discharge 03/18/2023 03:01:15 PM      PATIENT REFERRED TO:  Brittany Espino  [address] [place] [id number]  [phone]    Schedule an appointment as soon as possible for a visit in 2 days      [facility] EMERGENCY DEPT  [address] [place] [id number]  [phone]    If symptoms worsen return immediately    DISCHARGE MEDICATIONS:  There are no discharge medications for this patient. Controlled Substances Monitoring:     No flowsheet data found.     (Please note that portions of this note were completed with a voice recognition program.  Efforts were made to edit the dictations but occasionally words are mis-transcribed.)    RHODA Vieyra (electronically signed)  Attending Emergency Physician           Aram Lópezma  03/18/23 5458

## 2023-03-18 NOTE — ED NOTES
Unable to locate pt in waiting room. Assumed pt left before treatment completion. PA aware.      Lucero Freedman RN  03/18/23 5609
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
## 2024-06-28 ENCOUNTER — HOSPITAL ENCOUNTER (EMERGENCY)
Facility: HOSPITAL | Age: 44
Discharge: HOME | End: 2024-06-28
Payer: COMMERCIAL

## 2024-06-28 VITALS
TEMPERATURE: 98.5 F | DIASTOLIC BLOOD PRESSURE: 92 MMHG | OXYGEN SATURATION: 98 % | HEART RATE: 74 BPM | WEIGHT: 190 LBS | BODY MASS INDEX: 24.38 KG/M2 | HEIGHT: 74 IN | RESPIRATION RATE: 18 BRPM | SYSTOLIC BLOOD PRESSURE: 132 MMHG

## 2024-06-28 DIAGNOSIS — B00.9 HSV INFECTION: Primary | ICD-10-CM

## 2024-06-28 DIAGNOSIS — N52.9 ERECTILE DYSFUNCTION, UNSPECIFIED ERECTILE DYSFUNCTION TYPE: ICD-10-CM

## 2024-06-28 PROCEDURE — 99282 EMERGENCY DEPT VISIT SF MDM: CPT

## 2024-06-28 ASSESSMENT — PAIN - FUNCTIONAL ASSESSMENT: PAIN_FUNCTIONAL_ASSESSMENT: NONE - DENIES PAIN
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
## 2024-06-28 NOTE — ED TRIAGE NOTES
Pt arrived via Triage ambulatory c/o being diagnosed with HSV two years ago and came in today to be treated. Pt denies any symptoms at this time.